# Patient Record
Sex: MALE | Race: WHITE | Employment: FULL TIME | URBAN - METROPOLITAN AREA
[De-identification: names, ages, dates, MRNs, and addresses within clinical notes are randomized per-mention and may not be internally consistent; named-entity substitution may affect disease eponyms.]

---

## 2017-01-05 ENCOUNTER — OFFICE VISIT (OUTPATIENT)
Dept: INTERNAL MEDICINE CLINIC | Age: 67
End: 2017-01-05

## 2017-01-05 VITALS
HEART RATE: 85 BPM | DIASTOLIC BLOOD PRESSURE: 82 MMHG | SYSTOLIC BLOOD PRESSURE: 138 MMHG | HEIGHT: 66 IN | TEMPERATURE: 99.1 F | OXYGEN SATURATION: 97 % | RESPIRATION RATE: 16 BRPM | BODY MASS INDEX: 31.66 KG/M2 | WEIGHT: 197 LBS

## 2017-01-05 DIAGNOSIS — I10 ESSENTIAL HYPERTENSION: ICD-10-CM

## 2017-01-05 DIAGNOSIS — Z23 ENCOUNTER FOR IMMUNIZATION: ICD-10-CM

## 2017-01-05 DIAGNOSIS — C61 PROSTATE CARCINOMA (HCC): ICD-10-CM

## 2017-01-05 DIAGNOSIS — E78.2 MIXED HYPERLIPIDEMIA: Primary | ICD-10-CM

## 2017-01-05 RX ORDER — MINERAL OIL
ENEMA (ML) RECTAL
COMMUNITY
End: 2020-10-12

## 2017-01-05 NOTE — PROGRESS NOTES
1. Have you been to the ER, urgent care clinic since your last visit? Hospitalized since your last visit? No    2. Have you seen or consulted any other health care providers outside of the [address] [place] [address] since your last visit? Include any pap smears or colon screening.  No

## 2017-01-05 NOTE — PATIENT INSTRUCTIONS

## 2017-01-05 NOTE — PROGRESS NOTES
HPI:   Routine f/u of HTN/hyperlipidemia  Chol 168 in June 2016  Prostate bx 12/2016 - +CA  Chronic cough d/t post nasal drainage reduced with allegra/flonase; chest CT 7/2016 neg  w/o chest pain/abd. discomfort; no dyspnea or pedal edema; denies constitutional complaints of fever, night sweats or wt loss; no evidence of GI/ hemorrhage; no polyuria/polydipsia. Activity is age appropriate. ROS is otherwise negative. Past Medical History   Diagnosis Date    Anxiety     BPH (benign prostatic hypertrophy)     Cardiac echocardiogram 08/04/2015     EF 58%. No WMA. Gr 2 DDfx. Mild LAE. Mild CARLENE. Mild MR. Mild TR.  Cardiac treadmill stress test 09/26/2012     [facility]:  No ischemic EKG changes. Total exercise time 8:22.  Coronary calcium score 09/26/2012     [facility]:  Calcium score 1337    Diverticulosis 3-2005    Essential tremor     Generalized osteoarthrosis, involving multiple sites     Hypertension     Impotence of organic origin     Microscopic hematuria     Mixed hyperlipidemia     Prostate nodule     Unspecified venous (peripheral) insufficiency        Past Surgical History   Procedure Laterality Date    Hx acl reconstruction       right    Hx cervical fusion  8/2013     3 procedures through 10/2014    Hx knee arthroscopy Left     Hx cyst removal      Hx colonoscopy  3-2005 virtual 2011       Social History     Social History    Marital status:      Spouse name: N/A    Number of children: N/A    Years of education: N/A     Occupational History    Not on file.      Social History Main Topics    Smoking status: Never Smoker    Smokeless tobacco: Never Used    Alcohol use Yes      Comment: occasional glass of wine    Drug use: No    Sexual activity: Not on file     Other Topics Concern    Not on file     Social History Narrative       Allergies   Allergen Reactions    Other Food Unable to [employer]     Accupril [Quinapril] Cough    Epoxy Resin Other (comments)    Losartan Cough    Other Medication Rash and Itching     Epoxy resin    Pravastatin Myalgia    Tape [Adhesive] Unknown (comments)       Family History   Problem Relation Age of Onset    Diabetes Mother     Kidney Disease Father     Cancer Father      Father  of renal cancer    Diabetes Brother        Current Outpatient Prescriptions   Medication Sig Dispense Refill    fexofenadine (ALLEGRA) 180 mg tablet Take  by mouth daily as needed for Allergies.  desonide (TRIDESILON) 0.05 % cream APPLY TO AFFECTED AREAS TWICE A DAY FOR 3 DAYS CYCLES THEN STOP FOR 5 DAYS  5    sildenafil (REVATIO) 20 mg tablet TAKE 1 TABLET BY MOUTH AS DIRECTED  10    cyclobenzaprine (FLEXERIL) 10 mg tablet 1 tid prn pain/stiffness 30 Tab 1    valsartan-hydrochlorothiazide (DIOVAN HCT) 160-12.5 mg per tablet Take 1 Tab by mouth daily. 90 Tab 3    propranolol LA (INDERAL LA) 80 mg SR capsule Take 1 Cap by mouth daily. 90 Cap 3    rosuvastatin (CRESTOR) 10 mg tablet Take 1 Tab by mouth daily. 90 Tab 3    ascorbic acid (VITAMIN C) 1,000 mg tablet 1,000 mg.  therapeutic multivitamin-minerals (THERAGRAN-M) tablet Take 1 Tab by Mouth Once a Day.  tadalafil (CIALIS, ADCIRCA) 20 mg tablet Take 1 Tab by mouth daily as needed. 6 Tab 6    LORazepam (ATIVAN) 0.5 mg tablet Take  by mouth two (2) times daily as needed.  fluticasone (FLONASE) 50 mcg/actuation nasal spray 2 Sprays by Both Nostrils route daily as needed.  aspirin 81 mg tablet Take 81 mg by mouth daily. Visit Vitals    /82    Pulse 85    Temp 99.1 °F (37.3 °C) (Tympanic)    Resp 16    Ht 5' 6\" (1.676 m)    Wt 197 lb (89.4 kg)    SpO2 97%    BMI 31.8 kg/m2       PE  Well nourished in NAD  HEENT: OP: clear. Neck: supple w/o mass or bruits. Chest: clear. CV: RRR w/o m,r,g; pulses intact. Abd: soft, NT, w/o HSM or mass. Ext: w/o edema.   Neuro: essential tremor otherwise NF.    Assessment and Plan    Encounter Diagnoses   Name Primary?  Mixed hyperlipidemia Yes    Essential hypertension     Prostate carcinoma ([facility] [address]     Encounter for immunization    hyperlipidemia - continue dietary/exercise efforts  HTN - controlled  Recent dx of prostate CA managed by urology  Discussed the patient's above normal BMI with him. I have recommended the following interventions: dietary management education, guidance, and counseling .   The BMI follow up plan is as follows: BMI is out of normal parameters and plan is as follows: I have counseled this patient on diet and exercise regimens   Flu vaccine given  No change in rx  OV 6 mos or prn  I have explained plan to patient and the patient verbalizes understanding

## 2017-01-10 RX ORDER — CYCLOBENZAPRINE HCL 10 MG
TABLET ORAL
Qty: 30 TAB | Refills: 1 | Status: SHIPPED | OUTPATIENT
Start: 2017-01-10 | End: 2018-07-10 | Stop reason: SDUPTHER

## 2017-01-17 PROBLEM — N52.9 ED (ERECTILE DYSFUNCTION) OF ORGANIC ORIGIN: Status: ACTIVE | Noted: 2017-01-17

## 2017-01-17 PROBLEM — C61 PROSTATE CANCER (HCC): Status: ACTIVE | Noted: 2017-01-17

## 2017-03-08 RX ORDER — LORAZEPAM 0.5 MG/1
0.5 TABLET ORAL
Qty: 60 TAB | Refills: 1 | Status: SHIPPED | OUTPATIENT
Start: 2017-03-08 | End: 2017-10-19 | Stop reason: SDUPTHER

## 2017-03-08 NOTE — TELEPHONE ENCOUNTER
Requested Prescriptions     Pending Prescriptions Disp Refills    LORazepam (ATIVAN) 0.5 mg tablet       Sig: Take  by mouth two (2) times daily as needed.      Send to [facility] on [facility]

## 2017-03-29 RX ORDER — PROPRANOLOL HYDROCHLORIDE 80 MG/1
80 CAPSULE, EXTENDED RELEASE ORAL DAILY
Qty: 90 CAP | Refills: 3 | Status: SHIPPED | OUTPATIENT
Start: 2017-03-29 | End: 2018-11-21 | Stop reason: SDUPTHER

## 2017-08-17 ENCOUNTER — LAB ONLY (OUTPATIENT)
Dept: INTERNAL MEDICINE CLINIC | Age: 67
End: 2017-08-17

## 2017-08-17 ENCOUNTER — HOSPITAL ENCOUNTER (OUTPATIENT)
Dept: LAB | Age: 67
Discharge: HOME | End: 2017-08-17
Payer: COMMERCIAL

## 2017-08-17 DIAGNOSIS — I10 ESSENTIAL HYPERTENSION: ICD-10-CM

## 2017-08-17 DIAGNOSIS — I10 ESSENTIAL HYPERTENSION: Primary | ICD-10-CM

## 2017-08-17 LAB
ALBUMIN SERPL-MCNC: 4 G/DL (ref 3.4–5)
ALBUMIN/GLOB SERPL: 1.3 {RATIO} (ref 0.8–1.7)
ALP SERPL-CCNC: 81 U/L (ref 45–117)
ALT SERPL-CCNC: 43 U/L (ref 16–61)
ANION GAP SERPL CALC-SCNC: 6 MMOL/L (ref 3–18)
AST SERPL-CCNC: 26 U/L (ref 15–37)
BASOPHILS # BLD: 0.1 K/UL (ref 0–0.06)
BASOPHILS NFR BLD: 1 % (ref 0–2)
BILIRUB SERPL-MCNC: 0.3 MG/DL (ref 0.2–1)
BUN SERPL-MCNC: 28 MG/DL (ref 7–18)
BUN/CREAT SERPL: 22 (ref 12–20)
CALCIUM SERPL-MCNC: 8.8 MG/DL (ref 8.5–10.1)
CHLORIDE SERPL-SCNC: 103 MMOL/L (ref 100–108)
CHOLEST SERPL-MCNC: 164 MG/DL
CO2 SERPL-SCNC: 29 MMOL/L (ref 21–32)
CREAT SERPL-MCNC: 1.26 MG/DL (ref 0.6–1.3)
DIFFERENTIAL METHOD BLD: ABNORMAL
EOSINOPHIL # BLD: 0.5 K/UL (ref 0–0.4)
EOSINOPHIL NFR BLD: 9 % (ref 0–5)
ERYTHROCYTE [DISTWIDTH] IN BLOOD BY AUTOMATED COUNT: 12.1 % (ref 11.6–14.5)
GLOBULIN SER CALC-MCNC: 3.2 G/DL (ref 2–4)
GLUCOSE SERPL-MCNC: 107 MG/DL (ref 74–99)
HCT VFR BLD AUTO: 43 % (ref 36–48)
HDLC SERPL-MCNC: 47 MG/DL (ref 40–60)
HDLC SERPL: 3.5 {RATIO} (ref 0–5)
HGB BLD-MCNC: 14 G/DL (ref 13–16)
LDLC SERPL CALC-MCNC: 67.4 MG/DL (ref 0–100)
LIPID PROFILE,FLP: ABNORMAL
LYMPHOCYTES # BLD: 1.4 K/UL (ref 0.9–3.6)
LYMPHOCYTES NFR BLD: 25 % (ref 21–52)
MCH RBC QN AUTO: 33.3 PG (ref 24–34)
MCHC RBC AUTO-ENTMCNC: 32.6 G/DL (ref 31–37)
MCV RBC AUTO: 102.4 FL (ref 74–97)
MONOCYTES # BLD: 0.6 K/UL (ref 0.05–1.2)
MONOCYTES NFR BLD: 10 % (ref 3–10)
NEUTS SEG # BLD: 3.1 K/UL (ref 1.8–8)
NEUTS SEG NFR BLD: 55 % (ref 40–73)
PLATELET # BLD AUTO: 205 K/UL (ref 135–420)
PMV BLD AUTO: 10.6 FL (ref 9.2–11.8)
POTASSIUM SERPL-SCNC: 4.5 MMOL/L (ref 3.5–5.5)
PROT SERPL-MCNC: 7.2 G/DL (ref 6.4–8.2)
RBC # BLD AUTO: 4.2 M/UL (ref 4.7–5.5)
SODIUM SERPL-SCNC: 138 MMOL/L (ref 136–145)
TRIGL SERPL-MCNC: 248 MG/DL (ref ?–150)
VLDLC SERPL CALC-MCNC: 49.6 MG/DL
WBC # BLD AUTO: 5.7 K/UL (ref 4.6–13.2)

## 2017-08-17 PROCEDURE — 80053 COMPREHEN METABOLIC PANEL: CPT | Performed by: INTERNAL MEDICINE

## 2017-08-17 PROCEDURE — 80061 LIPID PANEL: CPT | Performed by: INTERNAL MEDICINE

## 2017-08-17 PROCEDURE — 85025 COMPLETE CBC W/AUTO DIFF WBC: CPT | Performed by: INTERNAL MEDICINE

## 2017-08-18 ENCOUNTER — OFFICE VISIT (OUTPATIENT)
Dept: INTERNAL MEDICINE CLINIC | Age: 67
End: 2017-08-18

## 2017-08-18 VITALS
RESPIRATION RATE: 16 BRPM | HEART RATE: 82 BPM | BODY MASS INDEX: 30.7 KG/M2 | DIASTOLIC BLOOD PRESSURE: 80 MMHG | SYSTOLIC BLOOD PRESSURE: 134 MMHG | HEIGHT: 66 IN | OXYGEN SATURATION: 97 % | WEIGHT: 191 LBS | TEMPERATURE: 98.6 F

## 2017-08-18 DIAGNOSIS — Z00.00 ROUTINE GENERAL MEDICAL EXAMINATION AT A HEALTH CARE FACILITY: Primary | ICD-10-CM

## 2017-08-18 DIAGNOSIS — Z23 ENCOUNTER FOR IMMUNIZATION: ICD-10-CM

## 2017-08-18 DIAGNOSIS — E78.2 MIXED HYPERLIPIDEMIA: ICD-10-CM

## 2017-08-18 DIAGNOSIS — I10 ESSENTIAL HYPERTENSION: ICD-10-CM

## 2017-08-18 NOTE — PROGRESS NOTES
1. Have you been to the ER, urgent care clinic since your last visit? Hospitalized since your last visit? No    2. Have you seen or consulted any other health care providers outside of the [facility] since your last visit? Include any pap smears or colon screening.  Yes When: july 31 Where: Dr Abundio Suazo Reason for visit: urologist

## 2017-08-18 NOTE — PATIENT INSTRUCTIONS

## 2017-08-18 NOTE — PROGRESS NOTES
HPI:   Routine check up  Hx notable for HTN/hyperlipidemia (recent chol 164)  Pt has chosen active surveillance for f/u of recent dx of prostate CA  w/o chest pain/abd. discomfort; no dyspnea, cough or pedal edema; denies constitutional complaints of fever, night sweats or wt loss; no evidence of GI/ hemorrhage; no polyuria/polydipsia. Activity is age appropriate. ROS is otherwise negative. Past Medical History:   Diagnosis Date    Anxiety     BPH (benign prostatic hypertrophy)     Cardiac echocardiogram 08/04/2015    EF 58%. No WMA. Gr 2 DDfx. Mild LAE. Mild CARLENE. Mild MR. Mild TR.  Cardiac treadmill stress test 09/26/2012    [facility]:  No ischemic EKG changes. Total exercise time 8:22.  Coronary calcium score 09/26/2012    [facility]:  Calcium score 1337    Diverticulosis 3-2005    Essential tremor     Generalized osteoarthrosis, involving multiple sites     Hypertension     Impotence of organic origin     Microscopic hematuria     Mixed hyperlipidemia     Personal history of prostate cancer     Prostate nodule     Unspecified venous (peripheral) insufficiency        Past Surgical History:   Procedure Laterality Date    HX ACL RECONSTRUCTION      right    HX CERVICAL FUSION  8/2013    3 procedures through 10/2014    HX COLONOSCOPY  3-2005    virtual 2011    HX CYST REMOVAL      HX KNEE ARTHROSCOPY Left     HX UROLOGICAL  12/23/2016    Prostate Biopsy With Dr. Andreea Wolf History     Social History    Marital status:      Spouse name: N/A    Number of children: N/A    Years of education: N/A     Occupational History    Not on file.      Social History Main Topics    Smoking status: Never Smoker    Smokeless tobacco: Never Used    Alcohol use Yes      Comment: occasional glass of wine    Drug use: No    Sexual activity: Not on file     Other Topics Concern    Not on file     Social History Narrative       Allergies Allergen Reactions    Other Food Unable to Obtain     Tuna Fish     Accupril [Quinapril] Cough    Epoxy Resin Other (comments)    Losartan Cough    Other Medication Rash and Itching     Epoxy resin    Pravastatin Myalgia    Tape [Adhesive] Unknown (comments)       Family History   Problem Relation Age of Onset    Diabetes Mother     Kidney Disease Father     Cancer Father      Father  of renal cancer    Diabetes Brother        Current Outpatient Prescriptions   Medication Sig Dispense Refill    propranolol LA (INDERAL LA) 80 mg SR capsule Take 1 Cap by mouth daily. 90 Cap 3    LORazepam (ATIVAN) 0.5 mg tablet Take 1 Tab by mouth two (2) times daily as needed. Max Daily Amount: 1 mg. 60 Tab 1    cyclobenzaprine (FLEXERIL) 10 mg tablet 1 tid prn pain/stiffness 30 Tab 1    fexofenadine (ALLEGRA) 180 mg tablet Take  by mouth daily as needed for Allergies.  desonide (TRIDESILON) 0.05 % cream APPLY TO AFFECTED AREAS TWICE A DAY FOR 3 DAYS CYCLES THEN STOP FOR 5 DAYS  5    sildenafil (REVATIO) 20 mg tablet TAKE 1 TABLET BY MOUTH AS DIRECTED  10    valsartan-hydrochlorothiazide (DIOVAN HCT) 160-12.5 mg per tablet Take 1 Tab by mouth daily. 90 Tab 3    rosuvastatin (CRESTOR) 10 mg tablet Take 1 Tab by mouth daily. 90 Tab 3    ascorbic acid (VITAMIN C) 1,000 mg tablet 1,000 mg.  therapeutic multivitamin-minerals (THERAGRAN-M) tablet Take 1 Tab by Mouth Once a Day.  fluticasone (FLONASE) 50 mcg/actuation nasal spray 2 Sprays by Both Nostrils route daily as needed.  aspirin 81 mg tablet Take 81 mg by mouth daily. Visit Vitals    /80 (BP 1 Location: Left arm, BP Patient Position: Sitting)    Pulse 82    Temp 98.6 °F (37 °C) (Tympanic)    Resp 16    Ht 5' 6\" (1.676 m)    Wt 191 lb (86.6 kg)    SpO2 97%    BMI 30.83 kg/m2       PE  Well nourished in NAD  HEENT: OP: clear. Neck: +kyphosis with LROM d/t past surgery; w/o mass or bruits. Chest: clear.   CV: RRR w/o m,r,g; pulses intact. Abd: soft, NT, w/o HSM or mass. Rectal: per urology  Ext: w/o edema. Neuro: essential tremor otherwise NF. Assessment and Plan    Encounter Diagnoses   Name Primary?  Routine general medical examination at a health care facility Yes    Essential hypertension     Mixed hyperlipidemia    HTN - controlled  Hyperlipidemia - continue dietary/exercise efforts  Prevnar/flu vaccines given  I have reviewed/discussed the above normal BMI with the patient. I have recommended the following interventions: dietary management education, guidance, and counseling . Paty Espinoza     No change in rx  OV 6 - 12 mos or prn  I have explained plan to patient and the patient verbalizes understanding

## 2017-10-19 ENCOUNTER — TELEPHONE (OUTPATIENT)
Dept: INTERNAL MEDICINE CLINIC | Age: 67
End: 2017-10-19

## 2017-10-19 RX ORDER — LORAZEPAM 0.5 MG/1
0.5 TABLET ORAL
Qty: 60 TAB | Refills: 1 | Status: SHIPPED | OUTPATIENT
Start: 2017-10-19 | End: 2018-08-21 | Stop reason: SDUPTHER

## 2017-10-19 NOTE — TELEPHONE ENCOUNTER
Requested Prescriptions     Pending Prescriptions Disp Refills    LORazepam (ATIVAN) 0.5 mg tablet 60 Tab 1     Sig: Take 1 Tab by mouth two (2) times daily as needed. Max Daily Amount: 1 mg.

## 2017-12-14 ENCOUNTER — OFFICE VISIT (OUTPATIENT)
Dept: CARDIOLOGY CLINIC | Age: 67
End: 2017-12-14

## 2017-12-14 VITALS
DIASTOLIC BLOOD PRESSURE: 90 MMHG | WEIGHT: 192 LBS | HEART RATE: 74 BPM | HEIGHT: 66 IN | OXYGEN SATURATION: 97 % | BODY MASS INDEX: 30.86 KG/M2 | SYSTOLIC BLOOD PRESSURE: 130 MMHG

## 2017-12-14 DIAGNOSIS — I10 ESSENTIAL HYPERTENSION: ICD-10-CM

## 2017-12-14 DIAGNOSIS — R93.1 AGATSTON CORONARY ARTERY CALCIUM SCORE GREATER THAN 400: ICD-10-CM

## 2017-12-14 DIAGNOSIS — E78.5 DYSLIPIDEMIA, GOAL LDL BELOW 70: ICD-10-CM

## 2017-12-14 DIAGNOSIS — I25.10 CORONARY ARTERY DISEASE INVOLVING NATIVE CORONARY ARTERY OF NATIVE HEART WITHOUT ANGINA PECTORIS: Primary | ICD-10-CM

## 2017-12-14 NOTE — PROGRESS NOTES
HISTORY OF PRESENT ILLNESS  Sheri Edwards is a 75 y.o. male. Hypertension   Pertinent negatives include no chest pain, no abdominal pain, no headaches and no shortness of breath. Patient presents for a follow-up office visit. The patient has a past medical history significant for long-standing hypertension and dyslipidemia. He underwent a coronary calcium score in 2012, and was found to have a significantly elevated score of 1337, which is in the upper quartile. He also underwent an exercise treadmill stress test, which was a normal test at 8 minutes 22 seconds a total exercise time, which was negative for ischemia. The patient was last seen in the office approximately one year ago. Since last visit, he reports she has been doing fairly well. He was diagnosed with prostate cancer since last visit, but was recommended to pursue conservative management. He denies any major change in his activity level. No exertional chest pain or shortness of breath. He tries to walk several days a week for at least 30 minutes each time. He also lifts weights occasionally. No palpitations, dizziness or syncope. Past Medical History:   Diagnosis Date    Anxiety     BPH (benign prostatic hypertrophy)     Cardiac echocardiogram 08/04/2015    EF 58%. No WMA. Gr 2 DDfx. Mild LAE. Mild CARLENE. Mild MR. Mild TR.  Cardiac treadmill stress test 09/26/2012    [facility]:  No ischemic EKG changes. Total exercise time 8:22.       Coronary calcium score 09/26/2012    [facility]:  Calcium score 1337    Diverticulosis 3-2005    Essential tremor     Generalized osteoarthrosis, involving multiple sites     Hypertension     Impotence of organic origin     Microscopic hematuria     Mixed hyperlipidemia     Personal history of prostate cancer     Prostate nodule     Unspecified venous (peripheral) insufficiency       Current Outpatient Prescriptions   Medication Sig Dispense Refill  LORazepam (ATIVAN) 0.5 mg tablet Take 1 Tab by mouth two (2) times daily as needed. Max Daily Amount: 1 mg. 60 Tab 1    propranolol LA (INDERAL LA) 80 mg SR capsule Take 1 Cap by mouth daily. 90 Cap 3    cyclobenzaprine (FLEXERIL) 10 mg tablet 1 tid prn pain/stiffness 30 Tab 1    fexofenadine (ALLEGRA) 180 mg tablet Take  by mouth daily as needed for Allergies.  desonide (TRIDESILON) 0.05 % cream APPLY TO AFFECTED AREAS TWICE A DAY FOR 3 DAYS CYCLES THEN STOP FOR 5 DAYS  5    sildenafil (REVATIO) 20 mg tablet TAKE 1 TABLET BY MOUTH AS DIRECTED  10    valsartan-hydrochlorothiazide (DIOVAN HCT) 160-12.5 mg per tablet Take 1 Tab by mouth daily. 90 Tab 3    rosuvastatin (CRESTOR) 10 mg tablet Take 1 Tab by mouth daily. 90 Tab 3    ascorbic acid (VITAMIN C) 1,000 mg tablet 1,000 mg.  therapeutic multivitamin-minerals (THERAGRAN-M) tablet Take 1 Tab by Mouth Once a Day.  fluticasone (FLONASE) 50 mcg/actuation nasal spray 2 Sprays by Both Nostrils route daily as needed.  aspirin 81 mg tablet Take 81 mg by mouth daily. Allergies   Allergen Reactions    Other Food Unable to Obtain     Tuna Fish     Accupril [Quinapril] Cough    Epoxy Resin Other (comments)    Losartan Cough    Other Medication Rash and Itching     Epoxy resin    Pravastatin Myalgia    Tape [Adhesive] Unknown (comments)      Social History   Substance Use Topics    Smoking status: Never Smoker    Smokeless tobacco: Never Used    Alcohol use Yes      Comment: occasional glass of wine            Review of Systems   Constitutional: Negative for chills, fever and weight loss. HENT: Negative for nosebleeds. Eyes: Negative for blurred vision and double vision. Respiratory: Negative for cough, shortness of breath and wheezing. Cardiovascular: Negative for chest pain, palpitations, orthopnea, claudication, leg swelling and PND. Gastrointestinal: Negative for abdominal pain, heartburn, nausea and vomiting. Genitourinary: Negative for dysuria and hematuria. Musculoskeletal: Negative for falls, myalgias and neck pain. Skin: Negative for rash. Neurological: Negative for dizziness, focal weakness and headaches. Endo/Heme/Allergies: Does not bruise/bleed easily. Psychiatric/Behavioral: Negative for substance abuse. Visit Vitals    /90    Pulse 74    Ht 5' 6\" (1.676 m)    Wt 87.1 kg (192 lb)    SpO2 97%    BMI 30.99 kg/m2      Physical Exam   Constitutional: He is oriented to person, place, and time. He appears well-developed and well-nourished. HENT:   Head: Normocephalic and atraumatic. Eyes: Conjunctivae are normal.   Neck: Neck supple. No JVD present. Carotid bruit is not present. Cardiovascular: Normal rate, regular rhythm, S1 normal, S2 normal and normal pulses. Exam reveals no gallop and no S3. No murmur heard. Pulmonary/Chest: Breath sounds normal. He has no wheezes. He has no rales. Abdominal: Soft. Bowel sounds are normal. There is no tenderness. Musculoskeletal: He exhibits no edema. Neurological: He is alert and oriented to person, place, and time. Skin: Skin is warm and dry. EKG:  Normal sinus rhythm, normal axis, normal  QTc interval, no ST or T wave abnormalities concerning for ischemia. No change compared to the previous EKG. ASSESSMENT and PLAN    Coronary artery disease. Nonobstructive by prior stress test and symptomatology. Patient had an elevated coronary calcium score > 400 in 2012. At that time he underwent a negative treadmill stress test.  Patient remains asymptomatic, but is at increased risk for a cardiovascular event given his high calcium score. I would continue his current cardiac regimen. Dyslipidemia. The patient is now taking Crestor 10 mg daily. He underwent a lipid panel earlier this year in August 2017 which was at goal.  LDL 67. Hypertension.  The patient's blood pressure has been reasonably well controlled on his current regimen, which I would continue. Followup in 12 months, sooner if needed.

## 2017-12-14 NOTE — PROGRESS NOTES
1. Have you been to the ER, urgent care clinic since your last visit? Hospitalized since your last visit?no  2. Have you seen or consulted any other health care providers outside of the [address] [place] [address] since your last visit? Include any pap smears or colon screening.   no

## 2017-12-14 NOTE — MR AVS SNAPSHOT
Visit Information Date & Time Provider Department Dept. Phone Encounter #  
 12/14/2017  2:40 PM Linda Carrel, MD [facility]  Your Appointments 1/18/2018 10:45 AM  
ESTABLISHED PATIENT with Jena Hyatt MD  
[facility]) Appt Note: Return in about 4 months (around 1/1/2018) for PSA prior [address] [facility], [place] [address] [facility] [address]  
  
   
 [address] [facility] [id number]  
  
    
 12/14/2018  8:40 AM  
Follow Up with Linda Carrel, MD  
[facility] ([address]) Appt Note: 1 year follow up [facility] [address] [address] [address] [phone] [address] [place] [address] [address] Upcoming Health Maintenance Date Due Hepatitis C Screening 1950 DTaP/Tdap/Td series (1 - Tdap) 5/10/1970 GLAUCOMA SCREENING Q2Y 5/10/2014 MEDICARE YEARLY EXAM 5/10/2014 Pneumococcal 65+ High/Highest Risk (2 of 2 - PPSV23) 12/14/2018 COLONOSCOPY 8/2/2019 Allergies as of 12/14/2017  Review Complete On: 12/14/2017 By: Reggie Love Severity Noted Reaction Type Reactions Other Food  08/28/2014    Unable to Obtain [facility] Accupril [Quinapril]    Cough Epoxy Resin  09/17/2015    Other (comments) Losartan    Cough Other Medication  09/04/2012    Rash, Itching Epoxy resin Pravastatin    Myalgia Tape [Adhesive]  09/04/2012    Unknown (comments) Current Immunizations  Reviewed on 6/20/2016 Name Date Influenza High Dose Vaccine PF 8/18/2017, 1/5/2017 Influenza Vaccine 8/18/2015, 9/19/2014 12:00 AM  
 Pneumococcal Conjugate (PCV-13) 8/18/2017 Pneumococcal Polysaccharide (PPSV-23) 12/14/2013 Zoster Vaccine, Live 6/1/2014 Not reviewed this visit You Were Diagnosed With   
  
 Codes Comments Coronary artery disease involving native coronary artery of native heart without angina pectoris    -  Primary ICD-10-CM: I25.10 ICD-9-CM: 414.01 Vitals BP Pulse Height(growth percentile) Weight(growth percentile) SpO2 BMI  
 130/90 74 5' 6\" (1.676 m) 192 lb (87.1 kg) 97% 30.99 kg/m2 Smoking Status Never Smoker Vitals History BMI and BSA Data Body Mass Index Body Surface Area 30.99 kg/m 2 2.01 m 2 Preferred Pharmacy Pharmacy Name Phone [id number], [address] [facility] [phone] Your Updated Medication List  
  
   
This list is accurate as of: 12/14/17  3:17 PM.  Always use your most recent med list.  
  
  
  
  
 aspirin 81 mg tablet Take 81 mg by mouth daily. cyclobenzaprine 10 mg tablet Commonly known as:  FLEXERIL  
1 tid prn pain/stiffness  
  
 desonide 0.05 % cream  
Commonly known as:  TRIDESILON  
APPLY TO AFFECTED AREAS TWICE A DAY FOR 3 DAYS CYCLES THEN STOP FOR 5 DAYS  
  
 fexofenadine 180 mg tablet Commonly known as:  Morrie Mantis Take  by mouth daily as needed for Allergies. fluticasone 50 mcg/actuation nasal spray Commonly known as:  [place] 2 Sprays by Both Nostrils route daily as needed. LORazepam 0.5 mg tablet Commonly known as:  ATIVAN Take 1 Tab by mouth two (2) times daily as needed. Max Daily Amount: 1 mg. propranolol LA 80 mg SR capsule Commonly known as:  INDERAL LA Take 1 Cap by mouth daily. rosuvastatin 10 mg tablet Commonly known as:  CRESTOR Take 1 Tab by mouth daily. sildenafil (antihypertensive) 20 mg tablet Commonly known as:  REVATIO  
TAKE 1 TABLET BY MOUTH AS DIRECTED  
  
 therapeutic multivitamin-minerals tablet Commonly known as:  THERAGRAN-M Take 1 Tab by Mouth Once a Day. valsartan-hydroCHLOROthiazide 160-12.5 mg per tablet Commonly known as:  DIOVAN HCT Take 1 Tab by mouth daily. VITAMIN C 1,000 mg tablet Generic drug:  ascorbic acid (vitamin C)  
1,000 mg. We Performed the Following AMB POC EKG ROUTINE W/ 12 LEADS, INTER & REP [69945 CPT(R)] Introducing [facility] SERVICES! Dear Lisseth Tillman: Thank you for requesting a [employer] account. Our records indicate that you already have an active [employer] account. You can access your account anytime at [email or web address] Did you know that you can access your hospital and ER discharge instructions at any time in [employer]? You can also review all of your test results from your hospital stay or ER visit. Additional Information If you have questions, please visit the Frequently Asked Questions section of the [employer] website at [email or web address] Remember, [employer] is NOT to be used for urgent needs. For medical emergencies, dial 911. Now available from your iPhone and Android! Please provide this summary of care documentation to your next provider. Your primary care clinician is listed as Yesika Benavides. If you have any questions after today's visit, please call [phone].

## 2018-01-18 PROBLEM — F40.240 CLAUSTROPHOBIA: Status: ACTIVE | Noted: 2018-01-18

## 2018-07-10 RX ORDER — CYCLOBENZAPRINE HCL 10 MG
TABLET ORAL
Qty: 30 TAB | Refills: 1 | Status: SHIPPED | OUTPATIENT
Start: 2018-07-10 | End: 2019-07-28 | Stop reason: SDUPTHER

## 2018-08-02 DIAGNOSIS — I10 ESSENTIAL HYPERTENSION, BENIGN: Primary | ICD-10-CM

## 2018-08-20 ENCOUNTER — HOSPITAL ENCOUNTER (OUTPATIENT)
Dept: LAB | Age: 68
Discharge: HOME | End: 2018-08-20
Payer: COMMERCIAL

## 2018-08-20 DIAGNOSIS — I10 ESSENTIAL HYPERTENSION, BENIGN: ICD-10-CM

## 2018-08-20 LAB
ALBUMIN SERPL-MCNC: 4 G/DL (ref 3.4–5)
ALBUMIN/GLOB SERPL: 1.2 {RATIO} (ref 0.8–1.7)
ALP SERPL-CCNC: 83 U/L (ref 45–117)
ALT SERPL-CCNC: 27 U/L (ref 16–61)
ANION GAP SERPL CALC-SCNC: 10 MMOL/L (ref 3–18)
AST SERPL-CCNC: 23 U/L (ref 15–37)
BASOPHILS # BLD: 0.1 K/UL (ref 0–0.1)
BASOPHILS NFR BLD: 1 % (ref 0–2)
BILIRUB SERPL-MCNC: 0.4 MG/DL (ref 0.2–1)
BUN SERPL-MCNC: 28 MG/DL (ref 7–18)
BUN/CREAT SERPL: 22 (ref 12–20)
CALCIUM SERPL-MCNC: 8.9 MG/DL (ref 8.5–10.1)
CHLORIDE SERPL-SCNC: 103 MMOL/L (ref 100–108)
CHOLEST SERPL-MCNC: 149 MG/DL
CO2 SERPL-SCNC: 26 MMOL/L (ref 21–32)
CREAT SERPL-MCNC: 1.29 MG/DL (ref 0.6–1.3)
DIFFERENTIAL METHOD BLD: ABNORMAL
EOSINOPHIL # BLD: 0.8 K/UL (ref 0–0.4)
EOSINOPHIL NFR BLD: 10 % (ref 0–5)
ERYTHROCYTE [DISTWIDTH] IN BLOOD BY AUTOMATED COUNT: 11.6 % (ref 11.6–14.5)
GLOBULIN SER CALC-MCNC: 3.3 G/DL (ref 2–4)
GLUCOSE SERPL-MCNC: 108 MG/DL (ref 74–99)
HCT VFR BLD AUTO: 43.8 % (ref 36–48)
HDLC SERPL-MCNC: 44 MG/DL (ref 40–60)
HDLC SERPL: 3.4 {RATIO} (ref 0–5)
HGB BLD-MCNC: 14.6 G/DL (ref 13–16)
LDLC SERPL CALC-MCNC: 69.4 MG/DL (ref 0–100)
LIPID PROFILE,FLP: ABNORMAL
LYMPHOCYTES # BLD: 1.7 K/UL (ref 0.9–3.6)
LYMPHOCYTES NFR BLD: 19 % (ref 21–52)
MCH RBC QN AUTO: 33.7 PG (ref 24–34)
MCHC RBC AUTO-ENTMCNC: 33.3 G/DL (ref 31–37)
MCV RBC AUTO: 101.2 FL (ref 74–97)
MONOCYTES # BLD: 0.7 K/UL (ref 0.05–1.2)
MONOCYTES NFR BLD: 8 % (ref 3–10)
NEUTS SEG # BLD: 5.4 K/UL (ref 1.8–8)
NEUTS SEG NFR BLD: 62 % (ref 40–73)
PLATELET # BLD AUTO: 256 K/UL (ref 135–420)
PMV BLD AUTO: 11 FL (ref 9.2–11.8)
POTASSIUM SERPL-SCNC: 4.7 MMOL/L (ref 3.5–5.5)
PROT SERPL-MCNC: 7.3 G/DL (ref 6.4–8.2)
RBC # BLD AUTO: 4.33 M/UL (ref 4.7–5.5)
SODIUM SERPL-SCNC: 139 MMOL/L (ref 136–145)
TRIGL SERPL-MCNC: 178 MG/DL (ref ?–150)
VLDLC SERPL CALC-MCNC: 35.6 MG/DL
WBC # BLD AUTO: 8.7 K/UL (ref 4.6–13.2)

## 2018-08-20 PROCEDURE — 80061 LIPID PANEL: CPT | Performed by: INTERNAL MEDICINE

## 2018-08-20 PROCEDURE — 85025 COMPLETE CBC W/AUTO DIFF WBC: CPT | Performed by: INTERNAL MEDICINE

## 2018-08-20 PROCEDURE — 36415 COLL VENOUS BLD VENIPUNCTURE: CPT | Performed by: INTERNAL MEDICINE

## 2018-08-20 PROCEDURE — 80053 COMPREHEN METABOLIC PANEL: CPT | Performed by: INTERNAL MEDICINE

## 2018-08-21 ENCOUNTER — OFFICE VISIT (OUTPATIENT)
Dept: INTERNAL MEDICINE CLINIC | Age: 68
End: 2018-08-21

## 2018-08-21 VITALS
HEART RATE: 83 BPM | DIASTOLIC BLOOD PRESSURE: 84 MMHG | WEIGHT: 195 LBS | TEMPERATURE: 98.6 F | OXYGEN SATURATION: 98 % | HEIGHT: 67 IN | SYSTOLIC BLOOD PRESSURE: 122 MMHG | BODY MASS INDEX: 30.61 KG/M2 | RESPIRATION RATE: 16 BRPM

## 2018-08-21 DIAGNOSIS — Z00.00 ROUTINE GENERAL MEDICAL EXAMINATION AT A HEALTH CARE FACILITY: Primary | ICD-10-CM

## 2018-08-21 DIAGNOSIS — F41.9 ANXIETY: ICD-10-CM

## 2018-08-21 DIAGNOSIS — I10 ESSENTIAL HYPERTENSION: ICD-10-CM

## 2018-08-21 DIAGNOSIS — E78.2 MIXED HYPERLIPIDEMIA: ICD-10-CM

## 2018-08-21 RX ORDER — LORAZEPAM 0.5 MG/1
0.5 TABLET ORAL
Qty: 60 TAB | Refills: 1 | Status: SHIPPED | OUTPATIENT
Start: 2018-08-21 | End: 2019-08-22 | Stop reason: SDUPTHER

## 2018-08-21 NOTE — PATIENT INSTRUCTIONS

## 2018-08-21 NOTE — PROGRESS NOTES
1. Have you been to the ER, urgent care clinic since your last visit? Hospitalized since your last visit? No    2. Have you seen or consulted any other health care providers outside of the [address] [place] [address] since your last visit? Include any pap smears or colon screening.  No

## 2018-08-21 NOTE — PROGRESS NOTES
HPI:   Check up  F/u visit for routine evaluation of HTN, hyperlipidemia  Recent chol 149  Sees urology for PC (active surveillance)  occas anxiety and uses ativan infrequently  w/o chest pain/abd. discomfort; no dyspnea, cough or pedal edema; denies constitutional complaints of fever, night sweats or wt loss; no evidence of GI/ hemorrhage. Activity is age appropriate. ROS is otherwise negative. Past Medical History:   Diagnosis Date    Anxiety     BPH (benign prostatic hypertrophy)     Cardiac echocardiogram 08/04/2015    EF 58%. No WMA. Gr 2 DDfx. Mild LAE. Mild CARLENE. Mild MR. Mild TR.  Cardiac treadmill stress test 09/26/2012    [facility]:  No ischemic EKG changes. Total exercise time 8:22.  Coronary calcium score 09/26/2012    [facility]:  Calcium score 1337    Diverticulosis 3-2005    Essential tremor     Generalized osteoarthrosis, involving multiple sites     Hypertension     Impotence of organic origin     Microscopic hematuria     Mixed hyperlipidemia     Personal history of prostate cancer     Prostate cancer ([facility] [address]     [id number] Bere 3+3 prostate cancer in 5/12 cores (LA, LM, LLA, LLM, LLB) involving 10-50% of the specimen, presenting PSA: 3.13 ng/mL    Prostate nodule     Unspecified venous (peripheral) insufficiency        Past Surgical History:   Procedure Laterality Date    HX ACL RECONSTRUCTION      right    HX CERVICAL FUSION  8/2013    3 procedures through 10/2014    HX COLONOSCOPY  3-2005 virtual 2011    HX CYST REMOVAL      HX KNEE ARTHROSCOPY Left     HX UROLOGICAL  12/23/2016    Prostate Biopsy With Dr. Kim Oreilly History     Social History    Marital status:      Spouse name: N/A    Number of children: N/A    Years of education: N/A     Occupational History    Not on file.      Social History Main Topics    Smoking status: Never Smoker    Smokeless tobacco: Never Used    Alcohol use Yes Comment: occasional glass of wine    Drug use: No    Sexual activity: Yes     Partners: Female      Comment: somewhat     Other Topics Concern    Not on file     Social History Narrative       Allergies   Allergen Reactions    Other Food Unable to Obtain     Tuna Fish     Accupril [Quinapril] Cough    Epoxy Resin Other (comments)    Losartan Cough    Other Medication Rash and Itching     Epoxy resin    Pravastatin Myalgia    Tape [Adhesive] Unknown (comments)       Family History   Problem Relation Age of Onset    Diabetes Mother     Kidney Disease Father     Cancer Father      Father  of renal cancer    Diabetes Brother        Current Outpatient Prescriptions   Medication Sig Dispense Refill    cyclobenzaprine (FLEXERIL) 10 mg tablet TAKE 1 TABLET BY MOUTH 3 TIMES A DAY AS NEEDED FOR PAIN/STIFFNESS 30 Tab 1    LORazepam (ATIVAN) 0.5 mg tablet Take 1 Tab by mouth two (2) times daily as needed. Max Daily Amount: 1 mg. 60 Tab 1    propranolol LA (INDERAL LA) 80 mg SR capsule Take 1 Cap by mouth daily. 90 Cap 3    fexofenadine (ALLEGRA) 180 mg tablet Take  by mouth daily as needed for Allergies.  desonide (TRIDESILON) 0.05 % cream APPLY TO AFFECTED AREAS TWICE A DAY FOR 3 DAYS CYCLES THEN STOP FOR 5 DAYS  5    sildenafil (REVATIO) 20 mg tablet TAKE 1 TABLET BY MOUTH AS DIRECTED  10    valsartan-hydrochlorothiazide (DIOVAN HCT) 160-12.5 mg per tablet Take 1 Tab by mouth daily. 90 Tab 3    rosuvastatin (CRESTOR) 10 mg tablet Take 1 Tab by mouth daily. 90 Tab 3    ascorbic acid (VITAMIN C) 1,000 mg tablet 1,000 mg.  therapeutic multivitamin-minerals (THERAGRAN-M) tablet Take 1 Tab by Mouth Once a Day.  fluticasone (FLONASE) 50 mcg/actuation nasal spray 2 Sprays by Both Nostrils route daily as needed.  aspirin 81 mg tablet Take 81 mg by mouth daily.                Visit Vitals    /84 (BP 1 Location: Right arm, BP Patient Position: Sitting)    Pulse 83    Temp 98.6 °F (37 °C) (Tympanic)    Resp 16    Ht 5' 7\" (1.702 m)    Wt 195 lb (88.5 kg)    SpO2 98%    BMI 30.54 kg/m2       PE  Well nourished in NAD  HEENT:   OP: clear. Neck: supple w/o mass or bruits. Chest: clear. CV: RRR w/o m,r,g; pulses intact. Abd: soft, NT, w/o HSM or mass. Rectal: per urology  Ext: w/o edema. Neuro: essential tremor    Assessment and Plan    Encounter Diagnoses   Name Primary?     Routine general medical examination at a health care facility Yes    Essential hypertension     Mixed hyperlipidemia    HTN - controlled  hyperlipidemia - continue dietary/exercise efforts  No change in rx  OV 6-12 mos or prn  I have explained plan to patient and the patient verbalizes understanding

## 2018-11-21 RX ORDER — PROPRANOLOL HYDROCHLORIDE 80 MG/1
80 CAPSULE, EXTENDED RELEASE ORAL DAILY
Qty: 90 CAP | Refills: 3 | Status: SHIPPED | OUTPATIENT
Start: 2018-11-21 | End: 2018-11-27 | Stop reason: SDUPTHER

## 2018-11-26 RX ORDER — SILDENAFIL CITRATE 20 MG/1
TABLET ORAL
Qty: 30 TAB | Refills: 9 | Status: SHIPPED | OUTPATIENT
Start: 2018-11-26 | End: 2019-08-23

## 2018-11-27 RX ORDER — PROPRANOLOL HYDROCHLORIDE 80 MG/1
80 CAPSULE, EXTENDED RELEASE ORAL DAILY
Qty: 90 CAP | Refills: 3 | Status: SHIPPED | OUTPATIENT
Start: 2018-11-27 | End: 2019-10-20 | Stop reason: SDUPTHER

## 2019-01-30 ENCOUNTER — OFFICE VISIT (OUTPATIENT)
Dept: CARDIOLOGY CLINIC | Age: 69
End: 2019-01-30

## 2019-01-30 VITALS
BODY MASS INDEX: 30.76 KG/M2 | SYSTOLIC BLOOD PRESSURE: 130 MMHG | OXYGEN SATURATION: 98 % | HEART RATE: 83 BPM | HEIGHT: 67 IN | WEIGHT: 196 LBS | DIASTOLIC BLOOD PRESSURE: 86 MMHG

## 2019-01-30 DIAGNOSIS — I10 ESSENTIAL HYPERTENSION: ICD-10-CM

## 2019-01-30 DIAGNOSIS — E78.5 DYSLIPIDEMIA, GOAL LDL BELOW 70: ICD-10-CM

## 2019-01-30 DIAGNOSIS — R93.1 AGATSTON CORONARY ARTERY CALCIUM SCORE GREATER THAN 400: Primary | ICD-10-CM

## 2019-01-30 DIAGNOSIS — I25.10 CORONARY ARTERY DISEASE INVOLVING NATIVE CORONARY ARTERY OF NATIVE HEART WITHOUT ANGINA PECTORIS: ICD-10-CM

## 2019-01-30 NOTE — PROGRESS NOTES
HISTORY OF PRESENT ILLNESS  Maddy Murdock is a 70 y.o. male. Hypertension   Pertinent negatives include no chest pain, no abdominal pain, no headaches and no shortness of breath. Patient presents for a follow-up office visit. The patient has a past medical history significant for long-standing hypertension and dyslipidemia. He underwent a coronary calcium score in 2012, and was found to have a significantly elevated score of 1337, which is in the upper quartile. He also underwent an exercise treadmill stress test, which was a normal test at 8 minutes 22 seconds a total exercise time, which was negative for ischemia. The patient was last seen in the office approximately one year ago. Since last visit, he reports she has been doing fairly well. He admits he is not quite as active up until recently over the past year because of a right knee injury. However his knee is feeling better, so he has started doing more exercise over the past month. He denies any exertional chest pain or shortness of breath. No leg swelling or claudication. Past Medical History:   Diagnosis Date    Anxiety     BPH (benign prostatic hypertrophy)     Cardiac echocardiogram 08/04/2015    EF 58%. No WMA. Gr 2 DDfx. Mild LAE. Mild CARLENE. Mild MR. Mild TR.  Cardiac treadmill stress test 09/26/2012    [facility]:  No ischemic EKG changes. Total exercise time 8:22.       Coronary calcium score 09/26/2012    [facility]:  Calcium score 1337    Diverticulosis 3-2005    Essential tremor     Generalized osteoarthrosis, involving multiple sites     Hypertension     Impotence of organic origin     Microscopic hematuria     Mixed hyperlipidemia     Personal history of prostate cancer     Prostate cancer ([facility] [address]     [id number] [place] 3+3 prostate cancer in 5/12 cores (LA, LM, LLA, LLM, LLB) involving 10-50% of the specimen, presenting PSA: 3.13 ng/mL    Prostate nodule     Unspecified venous (peripheral) insufficiency       Current Outpatient Medications   Medication Sig Dispense Refill    propranolol LA (INDERAL LA) 80 mg SR capsule Take 1 Cap by mouth daily. 90 Cap 3    sildenafil, antihypertensive, (REVATIO) 20 mg tablet TAKE 1 TABLET BY MOUTH AS DIRECTED 30 Tab 9    rosuvastatin (CRESTOR) 10 mg tablet TAKE 1 TAB BY MOUTH DAILY. 90 Tab 2    valsartan-hydroCHLOROthiazide (DIOVAN-HCT) 160-12.5 mg per tablet TAKE 1 TAB BY MOUTH DAILY. 90 Tab 1    LORazepam (ATIVAN) 0.5 mg tablet Take 1 Tab by mouth two (2) times daily as needed. Max Daily Amount: 1 mg. 60 Tab 1    cyclobenzaprine (FLEXERIL) 10 mg tablet TAKE 1 TABLET BY MOUTH 3 TIMES A DAY AS NEEDED FOR PAIN/STIFFNESS 30 Tab 1    fexofenadine (ALLEGRA) 180 mg tablet Take  by mouth daily as needed for Allergies.  desonide (TRIDESILON) 0.05 % cream APPLY TO AFFECTED AREAS TWICE A DAY FOR 3 DAYS CYCLES THEN STOP FOR 5 DAYS  5    ascorbic acid (VITAMIN C) 1,000 mg tablet 1,000 mg.  therapeutic multivitamin-minerals (THERAGRAN-M) tablet Take 1 Tab by Mouth Once a Day.  fluticasone (FLONASE) 50 mcg/actuation nasal spray 2 Sprays by Both Nostrils route daily as needed.  aspirin 81 mg tablet Take 81 mg by mouth daily. Allergies   Allergen Reactions    Other Food Unable to Obtain     Tuna Fish     Accupril [Quinapril] Cough    Epoxy Resin Other (comments)    Losartan Cough    Other Medication Rash and Itching     Epoxy resin    Pravastatin Myalgia    Tape [Adhesive] Unknown (comments)      Social History     Tobacco Use    Smoking status: Never Smoker    Smokeless tobacco: Never Used   Substance Use Topics    Alcohol use: Yes     Comment: occasional glass of wine    Drug use: No            Review of Systems   Constitutional: Negative for chills, fever and weight loss. HENT: Negative for nosebleeds. Eyes: Negative for blurred vision and double vision.    Respiratory: Negative for cough, shortness of breath and wheezing. Cardiovascular: Negative for chest pain, palpitations, orthopnea, claudication, leg swelling and PND. Gastrointestinal: Negative for abdominal pain, heartburn, nausea and vomiting. Genitourinary: Negative for dysuria and hematuria. Musculoskeletal: Negative for falls, myalgias and neck pain. Skin: Negative for rash. Neurological: Negative for dizziness, focal weakness and headaches. Endo/Heme/Allergies: Does not bruise/bleed easily. Psychiatric/Behavioral: Negative for substance abuse. Visit Vitals  /86   Pulse 83   Ht 5' 7\" (1.702 m)   Wt 88.9 kg (196 lb)   SpO2 98%   BMI 30.70 kg/m²      Physical Exam   Constitutional: He is oriented to person, place, and time. He appears well-developed and well-nourished. HENT:   Head: Normocephalic and atraumatic. Eyes: Conjunctivae are normal.   Neck: Neck supple. No JVD present. Carotid bruit is not present. Cardiovascular: Normal rate, regular rhythm, S1 normal, S2 normal and normal pulses. Exam reveals no gallop and no S3. No murmur heard. Pulmonary/Chest: Breath sounds normal. He has no wheezes. He has no rales. Abdominal: Soft. Bowel sounds are normal. There is no tenderness. Musculoskeletal: He exhibits no edema. Neurological: He is alert and oriented to person, place, and time. Skin: Skin is warm and dry. EKG:  Normal sinus rhythm, normal axis, normal  QTc interval, no ST or T wave abnormalities concerning for ischemia. No change compared to the previous EKG. ASSESSMENT and PLAN    Coronary artery disease. Nonobstructive by prior stress test.  No new symptoms concerning for angina. Patient had an elevated coronary calcium score > 400 in 2012. At that time he underwent a negative treadmill stress test.  I would continue his current cardiac regimen. Dyslipidemia. The patient is now taking Crestor 10 mg daily.   He underwent a repeat lipid panel earlier this year in August 2018 which was at goal.  LDL 71.    Hypertension. The patient's blood pressure has been reasonably well controlled on his current regimen, which I would continue. Followup in 12 months, sooner if needed.

## 2019-01-30 NOTE — PROGRESS NOTES
Kellie Solorio presents today for   Chief Complaint   Patient presents with    Coronary Artery Disease     1 year follow up        Kellie Solorio preferred language for health care discussion is english/other. Is someone accompanying this pt? No     Is the patient using any DME equipment during OV? No     Depression Screening:  PHQ over the last two weeks 8/21/2018   Little interest or pleasure in doing things Not at all   Feeling down, depressed, irritable, or hopeless Not at all   Total Score PHQ 2 0       Learning Assessment:  Learning Assessment 12/8/2014   PRIMARY LEARNER Patient   PRIMARY LANGUAGE ENGLISH   LEARNER PREFERENCE PRIMARY LISTENING       Abuse Screening:  Abuse Screening Questionnaire 1/5/2017   Do you ever feel afraid of your partner? N   Are you in a relationship with someone who physically or mentally threatens you? N   Is it safe for you to go home? Y       Fall Risk  Fall Risk Assessment, last 12 mths 8/21/2018   Able to walk? Yes   Fall in past 12 months? No       Pt currently taking Anticoagulant therapy? ASA 81mg daily     Coordination of Care:  1. Have you been to the ER, urgent care clinic since your last visit? Hospitalized since your last visit? No     2. Have you seen or consulted any other health care providers outside of the [address] [place] [address] since your last visit? Include any pap smears or colon screening.  No

## 2019-04-30 RX ORDER — VALSARTAN AND HYDROCHLOROTHIAZIDE 160; 12.5 MG/1; MG/1
1 TABLET, FILM COATED ORAL DAILY
Qty: 90 TAB | Refills: 1 | Status: SHIPPED | OUTPATIENT
Start: 2019-04-30 | End: 2019-08-22 | Stop reason: SDUPTHER

## 2019-06-04 ENCOUNTER — TELEPHONE (OUTPATIENT)
Dept: FAMILY MEDICINE CLINIC | Age: 69
End: 2019-06-04

## 2019-06-04 NOTE — TELEPHONE ENCOUNTER
Pt called to the office to schedule yearly cpe with Dr. Dionna Youngblood in August but also said that CNA is trying to change his care and they faxed over paperwork to the office that needs to be completed and faxed back by the end of the work week.    Please advise [phone]

## 2019-06-21 DIAGNOSIS — I10 ESSENTIAL HYPERTENSION, BENIGN: Primary | ICD-10-CM

## 2019-06-21 NOTE — PROGRESS NOTES
HPI:   Check up  F/u visit for routine evaluation of HTN,  hyperlipidemia  Chol 149 August 2018  Recent prostate bx showed Gl 6 adenoCA  w/o chest pain/abd. discomfort; no dyspnea, cough or pedal edema; denies constitutional complaints of fever, night sweats or wt loss; no evidence of GI/ hemorrhage. Activity is age appropriate. ROS is otherwise negative. Past Medical History:   Diagnosis Date    Anxiety     BPH (benign prostatic hypertrophy)     Cardiac echocardiogram 08/04/2015    EF 58%. No WMA. Gr 2 DDfx. Mild LAE. Mild CARLENE. Mild MR. Mild TR.  Cardiac treadmill stress test 09/26/2012    [facility]:  No ischemic EKG changes. Total exercise time 8:22.       Coronary calcium score 09/26/2012    [facility]:  Calcium score 1337    Diverticulosis 3-2005    Essential tremor     Generalized osteoarthrosis, involving multiple sites     Hypertension     Impotence of organic origin     Microscopic hematuria     Mixed hyperlipidemia     Personal history of prostate cancer     Prostate cancer ([address]     [id number] Bere 3+3 prostate cancer in 5/12 cores (LA, LM, LLA, LLM, LLB) involving 10-50% of the specimen, presenting PSA: 3.13 ng/mL    Prostate nodule     Unspecified venous (peripheral) insufficiency        Past Surgical History:   Procedure Laterality Date    HX ACL RECONSTRUCTION      right    HX CERVICAL FUSION  8/2013    3 procedures through 10/2014    HX COLONOSCOPY  3-2005 virtual 2011    HX CYST REMOVAL      HX KNEE ARTHROSCOPY Left     HX UROLOGICAL  12/23/2016    Prostate Biopsy With Dr. Placido Mendez History     Socioeconomic History    Marital status:      Spouse name: Not on file    Number of children: Not on file    Years of education: Not on file    Highest education level: Not on file   Occupational History    Not on file   Social Needs    Financial resource strain: Not on file    Food insecurity:     Worry: Not on file     Inability: Not on file    Transportation needs:     Medical: Not on file     Non-medical: Not on file   Tobacco Use    Smoking status: Never Smoker    Smokeless tobacco: Never Used   Substance and Sexual Activity    Alcohol use: Yes     Comment: occasional glass of wine    Drug use: No    Sexual activity: Yes     Partners: Female     Comment: somewhat   Lifestyle    Physical activity:     Days per week: Not on file     Minutes per session: Not on file    Stress: Not on file   Relationships    Social connections:     Talks on phone: Not on file     Gets together: Not on file     Attends Buddhist service: Not on file     Active member of club or organization: Not on file     Attends meetings of clubs or organizations: Not on file     Relationship status: Not on file    Intimate partner violence:     Fear of current or ex partner: Not on file     Emotionally abused: Not on file     Physically abused: Not on file     Forced sexual activity: Not on file   Other Topics Concern    Not on file   Social History Narrative    Not on file       Allergies   Allergen Reactions    Other Food Unable to [employer] Fish     Accupril [Quinapril] Cough    Epoxy Resin Other (comments)    Losartan Cough    Other Medication Rash and Itching     Epoxy resin    Pravastatin Myalgia    Tape [Adhesive] Unknown (comments)       Family History   Problem Relation Age of Onset    Diabetes Mother     Kidney Disease Father     Cancer Father         Father  of renal cancer    Diabetes Brother        Current Outpatient Medications   Medication Sig Dispense Refill    rosuvastatin (CRESTOR) 10 mg tablet TAKE 1 TAB BY MOUTH DAILY. 90 Tab 0    cyclobenzaprine (FLEXERIL) 10 mg tablet TAKE 1 TABLET BY MOUTH 3 TIMES A DAY AS NEEDED FOR PAIN/STIFFNESS 30 Tab 1    valsartan-hydroCHLOROthiazide (DIOVAN-HCT) 160-12.5 mg per tablet Take 1 Tab by mouth daily.  90 Tab 1    levoFLOXacin (LEVAQUIN) 750 mg tablet Take 1 tablet 2 hours prior to procedure 1 Tab 0    propranolol LA (INDERAL LA) 80 mg SR capsule Take 1 Cap by mouth daily. 90 Cap 3    sildenafil, antihypertensive, (REVATIO) 20 mg tablet TAKE 1 TABLET BY MOUTH AS DIRECTED 30 Tab 9    LORazepam (ATIVAN) 0.5 mg tablet Take 1 Tab by mouth two (2) times daily as needed. Max Daily Amount: 1 mg. 60 Tab 1    fexofenadine (ALLEGRA) 180 mg tablet Take  by mouth daily as needed for Allergies.  desonide (TRIDESILON) 0.05 % cream APPLY TO AFFECTED AREAS TWICE A DAY FOR 3 DAYS CYCLES THEN STOP FOR 5 DAYS  5    ascorbic acid (VITAMIN C) 1,000 mg tablet 1,000 mg.  therapeutic multivitamin-minerals (THERAGRAN-M) tablet Take 1 Tab by Mouth Once a Day.  fluticasone (FLONASE) 50 mcg/actuation nasal spray 2 Sprays by Both Nostrils route daily as needed.  aspirin 81 mg tablet Take 81 mg by mouth daily. Visit Vitals  /80 (BP 1 Location: Right arm, BP Patient Position: Sitting)   Pulse 85   Temp 98.7 °F (37.1 °C) (Tympanic)   Resp 16   Ht 5' 7\" (1.702 m)   Wt 196 lb (88.9 kg)   SpO2 97%   BMI 30.70 kg/m²       PE  Well nourished in NAD  HEENT:   OP: clear. Neck: supple w/o mass or bruits. Chest: clear. CV: RRR w/o m,r,g; pulses intact. Abd: soft, NT, w/o HSM or mass. Ext: w/o edema. Neuro: essential tremor    Assessment and Plan    Encounter Diagnoses   Name Primary?     Routine general medical examination at a health care facility Yes    Essential hypertension, benign     Mixed hyperlipidemia      HTN - controlled  hyperlipidemia - continue dietary/exercise efforts  PC - active surveillance  Sanders advised  No change in rx  OV 6-12  mos or prn  I have explained plan to patient and the patient verbalizes understanding

## 2019-07-29 RX ORDER — CYCLOBENZAPRINE HCL 10 MG
TABLET ORAL
Qty: 30 TAB | Refills: 1 | Status: SHIPPED | OUTPATIENT
Start: 2019-07-29 | End: 2020-07-22 | Stop reason: SDUPTHER

## 2019-08-09 PROBLEM — S50.00XA CONTUSION OF ELBOW: Status: ACTIVE | Noted: 2019-08-09

## 2019-08-09 PROBLEM — S90.519A ABRASION OF ANKLE: Status: ACTIVE | Noted: 2019-08-09

## 2019-08-22 ENCOUNTER — HOSPITAL ENCOUNTER (OUTPATIENT)
Dept: LAB | Age: 69
Discharge: HOME | End: 2019-08-22
Payer: SELF-PAY

## 2019-08-22 ENCOUNTER — OFFICE VISIT (OUTPATIENT)
Dept: FAMILY MEDICINE CLINIC | Age: 69
End: 2019-08-22

## 2019-08-22 VITALS
DIASTOLIC BLOOD PRESSURE: 80 MMHG | HEART RATE: 85 BPM | OXYGEN SATURATION: 97 % | TEMPERATURE: 98.7 F | HEIGHT: 67 IN | RESPIRATION RATE: 16 BRPM | BODY MASS INDEX: 30.76 KG/M2 | WEIGHT: 196 LBS | SYSTOLIC BLOOD PRESSURE: 134 MMHG

## 2019-08-22 DIAGNOSIS — Z00.00 ROUTINE GENERAL MEDICAL EXAMINATION AT A HEALTH CARE FACILITY: Primary | ICD-10-CM

## 2019-08-22 DIAGNOSIS — I10 ESSENTIAL HYPERTENSION, BENIGN: ICD-10-CM

## 2019-08-22 DIAGNOSIS — E78.2 MIXED HYPERLIPIDEMIA: ICD-10-CM

## 2019-08-22 DIAGNOSIS — F41.9 ANXIETY: ICD-10-CM

## 2019-08-22 LAB
ALBUMIN SERPL-MCNC: 4.2 G/DL (ref 3.4–5)
ALBUMIN/GLOB SERPL: 1.3 {RATIO} (ref 0.8–1.7)
ALP SERPL-CCNC: 101 U/L (ref 45–117)
ALT SERPL-CCNC: 41 U/L (ref 16–61)
ANION GAP SERPL CALC-SCNC: 6 MMOL/L (ref 3–18)
AST SERPL-CCNC: 31 U/L (ref 10–38)
BASOPHILS # BLD: 0.1 K/UL (ref 0–0.1)
BASOPHILS NFR BLD: 1 % (ref 0–2)
BILIRUB SERPL-MCNC: 0.3 MG/DL (ref 0.2–1)
BUN SERPL-MCNC: 26 MG/DL (ref 7–18)
BUN/CREAT SERPL: 21 (ref 12–20)
CALCIUM SERPL-MCNC: 9.1 MG/DL (ref 8.5–10.1)
CHLORIDE SERPL-SCNC: 100 MMOL/L (ref 100–111)
CO2 SERPL-SCNC: 28 MMOL/L (ref 21–32)
CREAT SERPL-MCNC: 1.26 MG/DL (ref 0.6–1.3)
DIFFERENTIAL METHOD BLD: ABNORMAL
EOSINOPHIL # BLD: 0.8 K/UL (ref 0–0.4)
EOSINOPHIL NFR BLD: 9 % (ref 0–5)
ERYTHROCYTE [DISTWIDTH] IN BLOOD BY AUTOMATED COUNT: 11.9 % (ref 11.6–14.5)
GLOBULIN SER CALC-MCNC: 3.2 G/DL (ref 2–4)
GLUCOSE SERPL-MCNC: 101 MG/DL (ref 74–99)
HCT VFR BLD AUTO: 43.8 % (ref 36–48)
HGB BLD-MCNC: 14.8 G/DL (ref 13–16)
LYMPHOCYTES # BLD: 2.3 K/UL (ref 0.9–3.6)
LYMPHOCYTES NFR BLD: 25 % (ref 21–52)
MCH RBC QN AUTO: 33.7 PG (ref 24–34)
MCHC RBC AUTO-ENTMCNC: 33.8 G/DL (ref 31–37)
MCV RBC AUTO: 99.8 FL (ref 74–97)
MONOCYTES # BLD: 0.5 K/UL (ref 0.05–1.2)
MONOCYTES NFR BLD: 6 % (ref 3–10)
NEUTS SEG # BLD: 5.5 K/UL (ref 1.8–8)
NEUTS SEG NFR BLD: 59 % (ref 40–73)
PLATELET # BLD AUTO: 255 K/UL (ref 135–420)
PMV BLD AUTO: 10.9 FL (ref 9.2–11.8)
POTASSIUM SERPL-SCNC: 4.4 MMOL/L (ref 3.5–5.5)
PROT SERPL-MCNC: 7.4 G/DL (ref 6.4–8.2)
RBC # BLD AUTO: 4.39 M/UL (ref 4.7–5.5)
SODIUM SERPL-SCNC: 134 MMOL/L (ref 136–145)
WBC # BLD AUTO: 9.3 K/UL (ref 4.6–13.2)

## 2019-08-22 PROCEDURE — 36415 COLL VENOUS BLD VENIPUNCTURE: CPT

## 2019-08-22 PROCEDURE — 80053 COMPREHEN METABOLIC PANEL: CPT

## 2019-08-22 PROCEDURE — 80061 LIPID PANEL: CPT

## 2019-08-22 PROCEDURE — 85025 COMPLETE CBC W/AUTO DIFF WBC: CPT

## 2019-08-22 RX ORDER — VALSARTAN AND HYDROCHLOROTHIAZIDE 160; 12.5 MG/1; MG/1
1 TABLET, FILM COATED ORAL DAILY
Qty: 90 TAB | Refills: 3 | Status: SHIPPED | OUTPATIENT
Start: 2019-08-22 | End: 2020-08-25 | Stop reason: SDUPTHER

## 2019-08-22 RX ORDER — LORAZEPAM 0.5 MG/1
TABLET ORAL
Qty: 60 TAB | Refills: 0 | Status: SHIPPED | OUTPATIENT
Start: 2019-08-22 | End: 2020-03-12 | Stop reason: SDUPTHER

## 2019-08-22 RX ORDER — ROSUVASTATIN CALCIUM 10 MG/1
10 TABLET, COATED ORAL DAILY
Qty: 90 TAB | Refills: 3 | Status: SHIPPED | OUTPATIENT
Start: 2019-08-22 | End: 2020-08-25 | Stop reason: SDUPTHER

## 2019-08-22 NOTE — PATIENT INSTRUCTIONS
DASH Diet: Care Instructions  Your Care Instructions    The DASH diet is an eating plan that can help lower your blood pressure. DASH stands for Dietary Approaches to Stop Hypertension. Hypertension is high blood pressure. The DASH diet focuses on eating foods that are high in calcium, potassium, and magnesium. These nutrients can lower blood pressure. The foods that are highest in these nutrients are fruits, vegetables, low-fat dairy products, nuts, seeds, and legumes. But taking calcium, potassium, and magnesium supplements instead of eating foods that are high in those nutrients does not have the same effect. The DASH diet also includes whole grains, fish, and poultry. The DASH diet is one of several lifestyle changes your doctor may recommend to lower your high blood pressure. Your doctor may also want you to decrease the amount of sodium in your diet. Lowering sodium while following the DASH diet can lower blood pressure even further than just the DASH diet alone. Follow-up care is a key part of your treatment and safety. Be sure to make and go to all appointments, and call your doctor if you are having problems. It's also a good idea to know your test results and keep a list of the medicines you take. How can you care for yourself at home? Following the DASH diet  · Eat 4 to 5 servings of fruit each day. A serving is 1 medium-sized piece of fruit, ½ cup chopped or canned fruit, 1/4 cup dried fruit, or 4 ounces (½ cup) of fruit juice. Choose fruit more often than fruit juice. · Eat 4 to 5 servings of vegetables each day. A serving is 1 cup of lettuce or raw leafy vegetables, ½ cup of chopped or cooked vegetables, or 4 ounces (½ cup) of vegetable juice. Choose vegetables more often than vegetable juice. · Get 2 to 3 servings of low-fat and fat-free dairy each day. A serving is 8 ounces of milk, 1 cup of yogurt, or 1 ½ ounces of cheese. · Eat 6 to 8 servings of grains each day.  A serving is 1 slice of bread, 1 ounce of dry cereal, or ½ cup of cooked rice, pasta, or cooked cereal. Try to choose whole-grain products as much as possible. · Limit lean meat, poultry, and fish to 2 servings each day. A serving is 3 ounces, about the size of a deck of cards. · Eat 4 to 5 servings of nuts, seeds, and legumes (cooked dried beans, lentils, and split peas) each week. A serving is 1/3 cup of nuts, 2 tablespoons of seeds, or ½ cup of cooked beans or peas. · Limit fats and oils to 2 to 3 servings each day. A serving is 1 teaspoon of vegetable oil or 2 tablespoons of salad dressing. · Limit sweets and added sugars to 5 servings or less a week. A serving is 1 tablespoon jelly or jam, ½ cup sorbet, or 1 cup of lemonade. · Eat less than 2,300 milligrams (mg) of sodium a day. If you limit your sodium to 1,500 mg a day, you can lower your blood pressure even more. Tips for success  · Start small. Do not try to make dramatic changes to your diet all at once. You might feel that you are missing out on your favorite foods and then be more likely to not follow the plan. Make small changes, and stick with them. Once those changes become habit, add a few more changes. · Try some of the following:  ? Make it a goal to eat a fruit or vegetable at every meal and at snacks. This will make it easy to get the recommended amount of fruits and vegetables each day. ? Try yogurt topped with fruit and nuts for a snack or healthy dessert. ? Add lettuce, tomato, cucumber, and onion to sandwiches. ? Combine a ready-made pizza crust with low-fat mozzarella cheese and lots of vegetable toppings. Try using tomatoes, squash, spinach, broccoli, carrots, cauliflower, and onions. ? Have a variety of cut-up vegetables with a low-fat dip as an appetizer instead of chips and dip. ? Sprinkle sunflower seeds or chopped almonds over salads. Or try adding chopped walnuts or almonds to cooked vegetables.   ? Try some vegetarian meals using beans and peas. Add garbanzo or kidney beans to salads. Make burritos and tacos with mashed forte beans or black beans. Where can you learn more? Go to http://abhishek-janna.info/. Enter V288 in the search box to learn more about \"DASH Diet: Care Instructions. \"  Current as of: July 22, 2018  Content Version: 12.1  © 2190-8863 [employer], [employer]. Care instructions adapted under license by [facility] (which disclaims liability or warranty for this information). If you have questions about a medical condition or this instruction, always ask your healthcare professional. [address] any warranty or liability for your use of this information.

## 2019-08-22 NOTE — PROGRESS NOTES
Chief Complaint   Patient presents with    Complete Physical       Pt preferred language for health care discussion is english. Is someone accompanying this pt? no    Is the patient using any DME equipment during [address]? no    Depression Screening:  3 most recent PHQ Screens 8/21/2018 8/18/2017 6/20/2016 8/18/2015   Little interest or pleasure in doing things Not at all Not at all Not at all Not at all   Feeling down, depressed, irritable, or hopeless Not at all Not at all Not at all Not at all   Total Score PHQ 2 0 0 0 0       Learning Assessment:  Learning Assessment 12/8/2014   PRIMARY LEARNER Patient   PRIMARY LANGUAGE ENGLISH   LEARNER PREFERENCE PRIMARY LISTENING       Abuse Screening:  Abuse Screening Questionnaire 1/5/2017   Do you ever feel afraid of your partner? N   Are you in a relationship with someone who physically or mentally threatens you? N   Is it safe for you to go home? Y       Fall Risk  Fall Risk Assessment, last 12 mths 8/21/2018   Able to walk? Yes   Fall in past 12 months? No           Advance Directive:  1. Do you have an advance directive in place? Patient Reply:no    2. If not, would you like material regarding how to put one in place? Patient Reply: no      Coordination of Care:  1. Have you been to the ER, urgent care clinic since your last visit? Hospitalized since your last visit? no    2. Have you seen or consulted any other health care providers outside of the [address] [place] [address] since your last visit? Include any pap smears or colon screening.  no    Provider prefers to do his own med reconciliation

## 2019-08-23 LAB
CHOLEST SERPL-MCNC: 180 MG/DL
HDLC SERPL-MCNC: 30 MG/DL (ref 40–60)
HDLC SERPL: 6 {RATIO} (ref 0–5)
LDLC SERPL CALC-MCNC: ABNORMAL MG/DL (ref 0–100)
LIPID PROFILE,FLP: ABNORMAL
TRIGL SERPL-MCNC: 669 MG/DL (ref ?–150)
VLDLC SERPL CALC-MCNC: ABNORMAL MG/DL

## 2019-09-11 ENCOUNTER — TELEPHONE (OUTPATIENT)
Dept: FAMILY MEDICINE CLINIC | Age: 69
End: 2019-09-11

## 2019-09-11 NOTE — TELEPHONE ENCOUNTER
Pt dropped off [facility] physician results form for completion.     Stated Dr Anand Rowley does this for him yearly     Form to be given to Nurse Karla Arrieta

## 2019-10-20 RX ORDER — PROPRANOLOL HYDROCHLORIDE 80 MG/1
CAPSULE, EXTENDED RELEASE ORAL
Qty: 90 CAP | Refills: 3 | Status: SHIPPED | OUTPATIENT
Start: 2019-10-20 | End: 2020-03-30 | Stop reason: SDUPTHER

## 2020-03-12 DIAGNOSIS — F41.9 ANXIETY: ICD-10-CM

## 2020-03-13 RX ORDER — LORAZEPAM 0.5 MG/1
0.5 TABLET ORAL
Qty: 60 TAB | Refills: 0 | Status: SHIPPED | OUTPATIENT
Start: 2020-03-13 | End: 2020-10-15 | Stop reason: SDUPTHER

## 2020-03-31 RX ORDER — PROPRANOLOL HYDROCHLORIDE 80 MG/1
80 CAPSULE, EXTENDED RELEASE ORAL DAILY
Qty: 90 CAP | Refills: 3 | Status: SHIPPED | OUTPATIENT
Start: 2020-03-31 | End: 2021-02-11 | Stop reason: SDUPTHER

## 2020-07-22 RX ORDER — CYCLOBENZAPRINE HCL 10 MG
10 TABLET ORAL
Qty: 30 TAB | Refills: 1 | Status: SHIPPED | OUTPATIENT
Start: 2020-07-22 | End: 2021-04-13 | Stop reason: SDUPTHER

## 2020-08-03 DIAGNOSIS — Z11.59 NEED FOR HEPATITIS C SCREENING TEST: ICD-10-CM

## 2020-08-03 DIAGNOSIS — I10 ESSENTIAL HYPERTENSION, BENIGN: Primary | ICD-10-CM

## 2020-08-14 NOTE — PROGRESS NOTES
Linh Herrera is a 69 y.o. male who was seen by synchronous (real-time) audio-video technology on 8/25/2020 for Hypertension        Assessment & Plan:   Diagnoses and all orders for this visit:    1. Essential hypertension, benign    2. Mixed hyperlipidemia      HTN/hyperlipidemia - continue dietary/exercise efforts  No change in rx  OV 6 mos or prn  I have explained plan to patient and the patient verbalizes understanding    I spent at least 15 minutes on this visit with this established patient. 712  Subjective:   F/u visit for routine evaluation of HTN,  hyperlipidemia  Chol 180 August 2019; recent chol 154  Sees urology for active surveillance for Gl 6 PC  No acute issues  Doing well w/o chest/abdominal pain, dyspnea, cough, fever, evidence of GI/ hemorrhage  30 # volitional wt loss since last year (better diet/more exercise)      Prior to Admission medications    Medication Sig Start Date End Date Taking? Authorizing Provider   cyclobenzaprine (FLEXERIL) 10 mg tablet Take 1 Tab by mouth three (3) times daily as needed for Muscle Spasm(s). 7/22/20   Severa Peaches, MD   propranolol LA (INDERAL LA) 80 mg SR capsule Take 1 Cap by mouth daily. 3/31/20   Severa Peaches, MD   LORazepam (Ativan) 0.5 mg tablet Take 1 Tab by mouth two (2) times daily as needed for Anxiety. Max Daily Amount: 1 mg. 3/13/20   Severa Peaches, MD   tadalafil (CIALIS) 20 mg tablet Take 1 tablet PRN intercourse 8/23/19   Tammie Ibarra PA-C   rosuvastatin (CRESTOR) 10 mg tablet Take 1 Tab by mouth daily. 8/22/19   Severa Peaches, MD   valsartan-hydroCHLOROthiazide (DIOVAN-HCT) 160-12.5 mg per tablet Take 1 Tab by mouth daily. 8/22/19   Severa Peaches, MD   levoFLOXacin (LEVAQUIN) 750 mg tablet Take 1 tablet 2 hours prior to procedure 3/6/19   Tammie Ibarra PA-C   fexofenadine (ALLEGRA) 180 mg tablet Take  by mouth daily as needed for Allergies.     Provider, Romero   desonide (TRIDESILON) 0.05 % cream APPLY TO AFFECTED AREAS TWICE A DAY FOR 3 DAYS CYCLES THEN STOP FOR 5 DAYS 9/8/16   Provider, Historical   ascorbic acid (VITAMIN C) 1,000 mg tablet 1,000 mg. Provider, Historical   therapeutic multivitamin-minerals (THERAGRAN-M) tablet Take 1 Tab by Mouth Once a Day. 1/14/14   Provider, Historical   fluticasone (FLONASE) 50 mcg/actuation nasal spray 2 Sprays by Both Nostrils route daily as needed. Provider, Historical   aspirin 81 mg tablet Take 81 mg by mouth daily. Provider, Historical     Current Outpatient Medications   Medication Sig Dispense Refill    cyclobenzaprine (FLEXERIL) 10 mg tablet Take 1 Tab by mouth three (3) times daily as needed for Muscle Spasm(s). 30 Tab 1    propranolol LA (INDERAL LA) 80 mg SR capsule Take 1 Cap by mouth daily. 90 Cap 3    LORazepam (Ativan) 0.5 mg tablet Take 1 Tab by mouth two (2) times daily as needed for Anxiety. Max Daily Amount: 1 mg. 60 Tab 0    tadalafil (CIALIS) 20 mg tablet Take 1 tablet PRN intercourse 30 Tab 3    rosuvastatin (CRESTOR) 10 mg tablet Take 1 Tab by mouth daily. 90 Tab 3    valsartan-hydroCHLOROthiazide (DIOVAN-HCT) 160-12.5 mg per tablet Take 1 Tab by mouth daily. 90 Tab 3    levoFLOXacin (LEVAQUIN) 750 mg tablet Take 1 tablet 2 hours prior to procedure 1 Tab 0    fexofenadine (ALLEGRA) 180 mg tablet Take  by mouth daily as needed for Allergies.  desonide (TRIDESILON) 0.05 % cream APPLY TO AFFECTED AREAS TWICE A DAY FOR 3 DAYS CYCLES THEN STOP FOR 5 DAYS  5    ascorbic acid (VITAMIN C) 1,000 mg tablet 1,000 mg.  therapeutic multivitamin-minerals (THERAGRAN-M) tablet Take 1 Tab by Mouth Once a Day.  fluticasone (FLONASE) 50 mcg/actuation nasal spray 2 Sprays by Both Nostrils route daily as needed.  aspirin 81 mg tablet Take 81 mg by mouth daily.          Allergies   Allergen Reactions    Other Food Unable to [employer]     Accupril [Quinapril] Cough    Epoxy Resin Other (comments)    Losartan Cough    Other Medication Rash and Itching     Epoxy resin    Pravastatin Myalgia    Tape [Adhesive] Unknown (comments)     Past Medical History:   Diagnosis Date    Anxiety     BPH (benign prostatic hypertrophy)     Cardiac echocardiogram 08/04/2015    EF 58%. No WMA. Gr 2 DDfx. Mild LAE. Mild CARLENE. Mild MR. Mild TR.  Cardiac treadmill stress test 09/26/2012    [facility]:  No ischemic EKG changes. Total exercise time 8:22.  Coronary calcium score 09/26/2012    [facility]:  Calcium score 1337    Diverticulosis 3-2005    Essential tremor     Generalized osteoarthrosis, involving multiple sites     Hypertension     Impotence of organic origin     Microscopic hematuria     Mixed hyperlipidemia     Personal history of prostate cancer     Prostate cancer ([facility] [address]     [id number] [place] 3+3 prostate cancer in 5/12 cores (LA, LM, LLA, LLM, LLB) involving 10-50% of the specimen, presenting PSA: 3.13 ng/mL    Prostate nodule     Unspecified venous (peripheral) insufficiency      Past Surgical History:   Procedure Laterality Date    HX ACL RECONSTRUCTION      right    HX CERVICAL FUSION  8/2013    3 procedures through 10/2014    HX COLONOSCOPY  3-2005 virtual 2011    HX CYST REMOVAL      HX KNEE ARTHROSCOPY Left     HX UROLOGICAL  12/23/2016    Prostate Biopsy With Dr. Li Singleton per HPI    Objective:   No flowsheet data found. General: alert, cooperative, no distress   Mental  status: normal mood, behavior, speech, dress, motor activity, and thought processes, able to follow commands   HENT: NCAT   Neck: no visualized mass   Resp: no respiratory distress   Neuro: no gross deficits   Skin: no discoloration or lesions of concern on visible areas   Psychiatric: normal affect, consistent with stated mood, no evidence of hallucinations     Additional exam findings: no      We discussed the expected course, resolution and complications of the diagnosis(es) in detail.   Medication risks, benefits, costs, interactions, and alternatives were discussed as indicated. I advised him to contact the office if his condition worsens, changes or fails to improve as anticipated. He expressed understanding with the diagnosis(es) and plan. Peg Knutson, who was evaluated through a patient-initiated, synchronous (real-time) audio-video encounter, and/or his healthcare decision maker, is aware that it is a billable service, with coverage as determined by his insurance carrier. He provided verbal consent to proceed: Yes, and patient identification was verified. It was conducted pursuant to the emergency declaration under the [address] [place] [address] authority and the [employer] Act. A caregiver was present when appropriate. Ability to conduct physical exam was limited. I was at home. The patient was at home.       Alie Dutta MD

## 2020-08-17 ENCOUNTER — HOSPITAL ENCOUNTER (OUTPATIENT)
Dept: LAB | Age: 70
Discharge: HOME | End: 2020-08-17
Payer: COMMERCIAL

## 2020-08-17 DIAGNOSIS — I10 ESSENTIAL HYPERTENSION, BENIGN: ICD-10-CM

## 2020-08-17 DIAGNOSIS — Z11.59 NEED FOR HEPATITIS C SCREENING TEST: ICD-10-CM

## 2020-08-17 LAB
ALBUMIN SERPL-MCNC: 4 G/DL (ref 3.4–5)
ALBUMIN/GLOB SERPL: 1.3 {RATIO} (ref 0.8–1.7)
ALP SERPL-CCNC: 103 U/L (ref 45–117)
ALT SERPL-CCNC: 23 U/L (ref 16–61)
ANION GAP SERPL CALC-SCNC: 4 MMOL/L (ref 3–18)
AST SERPL-CCNC: 19 U/L (ref 10–38)
BASOPHILS # BLD: 0.1 K/UL (ref 0–0.1)
BASOPHILS NFR BLD: 2 % (ref 0–2)
BILIRUB SERPL-MCNC: 0.5 MG/DL (ref 0.2–1)
BUN SERPL-MCNC: 29 MG/DL (ref 7–18)
BUN/CREAT SERPL: 19 (ref 12–20)
CALCIUM SERPL-MCNC: 9.2 MG/DL (ref 8.5–10.1)
CHLORIDE SERPL-SCNC: 106 MMOL/L (ref 100–111)
CHOLEST SERPL-MCNC: 154 MG/DL
CO2 SERPL-SCNC: 30 MMOL/L (ref 21–32)
CREAT SERPL-MCNC: 1.54 MG/DL (ref 0.6–1.3)
DIFFERENTIAL METHOD BLD: ABNORMAL
EOSINOPHIL # BLD: 1 K/UL (ref 0–0.4)
EOSINOPHIL NFR BLD: 14 % (ref 0–5)
ERYTHROCYTE [DISTWIDTH] IN BLOOD BY AUTOMATED COUNT: 12.2 % (ref 11.6–14.5)
GLOBULIN SER CALC-MCNC: 3.2 G/DL (ref 2–4)
GLUCOSE SERPL-MCNC: 109 MG/DL (ref 74–99)
HCT VFR BLD AUTO: 41.4 % (ref 36–48)
HDLC SERPL-MCNC: 52 MG/DL (ref 40–60)
HDLC SERPL: 3 {RATIO} (ref 0–5)
HGB BLD-MCNC: 13.6 G/DL (ref 13–16)
LDLC SERPL CALC-MCNC: 83.4 MG/DL (ref 0–100)
LIPID PROFILE,FLP: NORMAL
LYMPHOCYTES # BLD: 2 K/UL (ref 0.9–3.6)
LYMPHOCYTES NFR BLD: 28 % (ref 21–52)
MCH RBC QN AUTO: 32.9 PG (ref 24–34)
MCHC RBC AUTO-ENTMCNC: 32.9 G/DL (ref 31–37)
MCV RBC AUTO: 100 FL (ref 74–97)
MONOCYTES # BLD: 0.6 K/UL (ref 0.05–1.2)
MONOCYTES NFR BLD: 9 % (ref 3–10)
NEUTS SEG # BLD: 3.4 K/UL (ref 1.8–8)
NEUTS SEG NFR BLD: 47 % (ref 40–73)
PLATELET # BLD AUTO: 220 K/UL (ref 135–420)
PMV BLD AUTO: 11.1 FL (ref 9.2–11.8)
POTASSIUM SERPL-SCNC: 5.1 MMOL/L (ref 3.5–5.5)
PROT SERPL-MCNC: 7.2 G/DL (ref 6.4–8.2)
RBC # BLD AUTO: 4.14 M/UL (ref 4.7–5.5)
SODIUM SERPL-SCNC: 140 MMOL/L (ref 136–145)
TRIGL SERPL-MCNC: 93 MG/DL (ref ?–150)
VLDLC SERPL CALC-MCNC: 18.6 MG/DL
WBC # BLD AUTO: 7 K/UL (ref 4.6–13.2)

## 2020-08-17 PROCEDURE — 80061 LIPID PANEL: CPT

## 2020-08-17 PROCEDURE — 36415 COLL VENOUS BLD VENIPUNCTURE: CPT

## 2020-08-17 PROCEDURE — 80053 COMPREHEN METABOLIC PANEL: CPT

## 2020-08-17 PROCEDURE — 85025 COMPLETE CBC W/AUTO DIFF WBC: CPT

## 2020-08-17 PROCEDURE — 86803 HEPATITIS C AB TEST: CPT

## 2020-08-18 LAB
HCV AB SER IA-ACNC: 0.04 INDEX
HCV AB SERPL QL IA: NEGATIVE
HCV COMMENT,HCGAC: NORMAL

## 2020-08-25 ENCOUNTER — VIRTUAL VISIT (OUTPATIENT)
Dept: FAMILY MEDICINE CLINIC | Age: 70
End: 2020-08-25

## 2020-08-25 DIAGNOSIS — E78.2 MIXED HYPERLIPIDEMIA: ICD-10-CM

## 2020-08-25 DIAGNOSIS — I10 ESSENTIAL HYPERTENSION, BENIGN: Primary | ICD-10-CM

## 2020-08-25 RX ORDER — ROSUVASTATIN CALCIUM 10 MG/1
10 TABLET, COATED ORAL DAILY
Qty: 90 TAB | Refills: 3 | Status: SHIPPED | OUTPATIENT
Start: 2020-08-25

## 2020-08-25 RX ORDER — VALSARTAN AND HYDROCHLOROTHIAZIDE 160; 12.5 MG/1; MG/1
1 TABLET, FILM COATED ORAL DAILY
Qty: 90 TAB | Refills: 3 | Status: SHIPPED | OUTPATIENT
Start: 2020-08-25

## 2020-10-12 ENCOUNTER — OFFICE VISIT (OUTPATIENT)
Dept: CARDIOLOGY CLINIC | Age: 70
End: 2020-10-12
Payer: COMMERCIAL

## 2020-10-12 VITALS
HEIGHT: 67 IN | BODY MASS INDEX: 28.19 KG/M2 | OXYGEN SATURATION: 98 % | HEART RATE: 68 BPM | DIASTOLIC BLOOD PRESSURE: 76 MMHG | SYSTOLIC BLOOD PRESSURE: 132 MMHG | WEIGHT: 179.6 LBS

## 2020-10-12 DIAGNOSIS — I25.10 CORONARY ARTERY DISEASE INVOLVING NATIVE CORONARY ARTERY OF NATIVE HEART WITHOUT ANGINA PECTORIS: ICD-10-CM

## 2020-10-12 DIAGNOSIS — I25.10 CORONARY ARTERY DISEASE INVOLVING NATIVE CORONARY ARTERY OF NATIVE HEART WITHOUT ANGINA PECTORIS: Primary | ICD-10-CM

## 2020-10-12 DIAGNOSIS — R93.1 AGATSTON CORONARY ARTERY CALCIUM SCORE GREATER THAN 400: ICD-10-CM

## 2020-10-12 DIAGNOSIS — I10 ESSENTIAL HYPERTENSION, BENIGN: ICD-10-CM

## 2020-10-12 DIAGNOSIS — E78.2 MIXED HYPERLIPIDEMIA: ICD-10-CM

## 2020-10-12 PROCEDURE — 93000 ELECTROCARDIOGRAM COMPLETE: CPT | Performed by: INTERNAL MEDICINE

## 2020-10-12 PROCEDURE — 99214 OFFICE O/P EST MOD 30 MIN: CPT | Performed by: INTERNAL MEDICINE

## 2020-10-12 RX ORDER — AA/PROT/LYSINE/METHIO/VIT C/B6 50-12.5 MG
1 TABLET ORAL DAILY
COMMUNITY

## 2020-10-12 RX ORDER — GLUCOSAMINE SULFATE 1500 MG
POWDER IN PACKET (EA) ORAL DAILY
COMMUNITY

## 2020-10-12 NOTE — PROGRESS NOTES
HISTORY OF PRESENT ILLNESS  Ethan Caballero is a 69 y.o. male. Hypertension   Pertinent negatives include no chest pain, no abdominal pain, no headaches and no shortness of breath. Follow-up   Pertinent negatives include no chest pain, no abdominal pain, no headaches and no shortness of breath. Patient presents for an add-on office visit. The patient has a past medical history significant for long-standing hypertension and dyslipidemia. He underwent a coronary calcium score in 2012, and was found to have a significantly elevated score of 1337, which is in the upper quartile. He also underwent an exercise treadmill stress test, which was a normal test at 8 minutes 22 seconds a total exercise time, which was negative for ischemia. The patient was last seen in the office over a year and a half ago. Since last visit, the patient has been feeling relatively well. He states he significantly changed his diet by cutting out alcohol completely and eating less. As result he lost almost 25 pounds in weight. He did gain some of the weight back during the COVID-19 pandemic. He denies any chest pain, shortness of breath or leg swelling. His biggest issues involve joint pains. Past Medical History:   Diagnosis Date    Anxiety     BPH (benign prostatic hypertrophy)     Cardiac echocardiogram 08/04/2015    EF 58%. No WMA. Gr 2 DDfx. Mild LAE. Mild CARLENE. Mild MR. Mild TR.  Cardiac treadmill stress test 09/26/2012    [facility]:  No ischemic EKG changes. Total exercise time 8:22.       Coronary calcium score 09/26/2012    [facility]:  Calcium score 1337    Diverticulosis 3-2005    Essential tremor     Generalized osteoarthrosis, involving multiple sites     Hypertension     Impotence of organic origin     Microscopic hematuria     Mixed hyperlipidemia     Personal history of prostate cancer     Prostate cancer ([facility] [address]     [id number] [place] 3+3 prostate cancer in 5/12 cores (LA, LM, LLA, LLM, LLB) involving 10-50% of the specimen, presenting PSA: 3.13 ng/mL    Prostate nodule     Unspecified venous (peripheral) insufficiency       Current Outpatient Medications   Medication Sig Dispense Refill    coenzyme q10 (Co Q-10) 10 mg cap Take  by mouth.  cholecalciferol (Vitamin D3) 25 mcg (1,000 unit) cap Take  by mouth daily.  ZINC PO Take  by mouth.  rosuvastatin (CRESTOR) 10 mg tablet Take 1 Tab by mouth daily. 90 Tab 3    valsartan-hydroCHLOROthiazide (DIOVAN-HCT) 160-12.5 mg per tablet Take 1 Tab by mouth daily. 90 Tab 3    cyclobenzaprine (FLEXERIL) 10 mg tablet Take 1 Tab by mouth three (3) times daily as needed for Muscle Spasm(s). 30 Tab 1    propranolol LA (INDERAL LA) 80 mg SR capsule Take 1 Cap by mouth daily. 90 Cap 3    LORazepam (Ativan) 0.5 mg tablet Take 1 Tab by mouth two (2) times daily as needed for Anxiety. Max Daily Amount: 1 mg. 60 Tab 0    tadalafil (CIALIS) 20 mg tablet Take 1 tablet PRN intercourse 30 Tab 3    desonide (TRIDESILON) 0.05 % cream APPLY TO AFFECTED AREAS TWICE A DAY FOR 3 DAYS CYCLES THEN STOP FOR 5 DAYS  5    ascorbic acid (VITAMIN C) 1,000 mg tablet 1,000 mg.  therapeutic multivitamin-minerals (THERAGRAN-M) tablet Take 1 Tab by Mouth Once a Day.  fluticasone (FLONASE) 50 mcg/actuation nasal spray 2 Sprays by Both Nostrils route daily as needed.  aspirin 81 mg tablet Take 81 mg by mouth daily.            Allergies   Allergen Reactions    Other Food Unable to Obtain     Tuna Fish     Accupril [Quinapril] Cough    Epoxy Resin Other (comments)    Losartan Cough    Other Medication Rash and Itching     Epoxy resin    Pravastatin Myalgia    Tape [Adhesive] Unknown (comments)      Social History     Tobacco Use    Smoking status: Never Smoker    Smokeless tobacco: Never Used   Substance Use Topics    Alcohol use: Yes     Comment: occasional glass of wine    Drug use: No            Review of Systems Constitutional: Negative for chills, fever and weight loss. HENT: Negative for nosebleeds. Eyes: Negative for blurred vision and double vision. Respiratory: Negative for cough, shortness of breath and wheezing. Cardiovascular: Negative for chest pain, palpitations, orthopnea, claudication, leg swelling and PND. Gastrointestinal: Negative for abdominal pain, heartburn, nausea and vomiting. Genitourinary: Negative for dysuria and hematuria. Musculoskeletal: Negative for falls, myalgias and neck pain. Skin: Negative for rash. Neurological: Negative for dizziness, focal weakness and headaches. Endo/Heme/Allergies: Does not bruise/bleed easily. Psychiatric/Behavioral: Negative for substance abuse. Visit Vitals  /76 (BP 1 Location: Right arm, BP Patient Position: Sitting)   Pulse 68   Ht 5' 7\" (1.702 m)   Wt 81.5 kg (179 lb 9.6 oz)   SpO2 98%   BMI 28.13 kg/m²      Physical Exam   Constitutional: He is oriented to person, place, and time. He appears well-developed and well-nourished. HENT:   Head: Normocephalic and atraumatic. Eyes: Conjunctivae are normal.   Neck: Neck supple. No JVD present. Carotid bruit is not present. Cardiovascular: Normal rate, regular rhythm, S1 normal, S2 normal and normal pulses. Exam reveals no gallop and no S3. No murmur heard. Pulmonary/Chest: Breath sounds normal. He has no wheezes. He has no rales. Abdominal: Soft. Bowel sounds are normal. There is no abdominal tenderness. Musculoskeletal:         General: No edema. Neurological: He is alert and oriented to person, place, and time. Skin: Skin is warm and dry. EKG:  Normal sinus rhythm, normal axis, normal  QTc interval, no ST or T wave abnormalities concerning for ischemia. Occasional PVC. Compared to the previous EKG, the PVC is new. ASSESSMENT and PLAN    Coronary artery disease. Nonobstructive by history of remote stress test.  No new symptoms concerning for angina.   Patient had an elevated coronary calcium score > 400 in 2012. I have recommended repeat exercise stress test for further risk ratification. Dyslipidemia. The patient is now taking Crestor 10 mg daily. He underwent a repeat lipid panel in August 2020 which looked reasonable but his numbers were a little higher than they were 2 years ago. Total cholesterol 154, triglycerides 93, HDL 51, LDL 83. I would continue this regimen for now. Essential hypertension. The patient's blood pressure has been reasonably well controlled on his current regimen, which I would continue. Chronic kidney disease, stage III. Recent creatinine of 1.5. This is followed by his PCP. As long as his stress test remains low risk, patient can follow-up in 12 months, sooner if needed.

## 2020-10-12 NOTE — PROGRESS NOTES
Dylan Benítez presents today for   Chief Complaint   Patient presents with    Follow-up     overdue 1 year follow up       Dylan Benítez preferred language for health care discussion is english/other. Is someone accompanying this pt? no    Is the patient using any DME equipment during [address]? no    Depression Screening:  3 most recent PHQ Screens 10/12/2020   Little interest or pleasure in doing things Not at all   Feeling down, depressed, irritable, or hopeless -   Total Score PHQ 2 -       Learning Assessment:  Learning Assessment 12/8/2014   PRIMARY LEARNER Patient   PRIMARY LANGUAGE ENGLISH   LEARNER PREFERENCE PRIMARY LISTENING       Abuse Screening:  Abuse Screening Questionnaire 10/12/2020   Do you ever feel afraid of your partner? N   Are you in a relationship with someone who physically or mentally threatens you? N   Is it safe for you to go home? Y       Fall Risk  Fall Risk Assessment, last 12 mths 10/12/2020   Able to walk? Yes   Fall in past 12 months? No       Pt currently taking Anticoagulant therapy? no    Coordination of Care:  1. Have you been to the ER, urgent care clinic since your last visit? Hospitalized since your last visit? no    2. Have you seen or consulted any other health care providers outside of the [address] [place] [address] since your last visit? Include any pap smears or colon screening.  no

## 2020-10-15 DIAGNOSIS — F41.9 ANXIETY: ICD-10-CM

## 2020-10-19 RX ORDER — LORAZEPAM 0.5 MG/1
0.5 TABLET ORAL
Qty: 60 TAB | Refills: 0 | Status: SHIPPED | OUTPATIENT
Start: 2020-10-19 | End: 2021-02-12 | Stop reason: SDUPTHER

## 2020-10-19 NOTE — TELEPHONE ENCOUNTER
Last Visit: 8/25/20 with MD Renea Fong  Next Appointment: Advised to follow-up in 6 months  Previous Refill Encounter(s): 3/13/20 #60    Requested Prescriptions     Pending Prescriptions Disp Refills    LORazepam (Ativan) 0.5 mg tablet 60 Tab 0     Sig: Take 1 Tab by mouth two (2) times daily as needed for Anxiety. Max Daily Amount: 1 mg.

## 2020-10-22 ENCOUNTER — TELEPHONE (OUTPATIENT)
Dept: CARDIOLOGY CLINIC | Age: 70
End: 2020-10-22

## 2020-10-23 LAB
STRESS ANGINA INDEX: 0
STRESS BASELINE DIAS BP: 80 MMHG
STRESS BASELINE HR: 75 BPM
STRESS BASELINE SYS BP: 130 MMHG
STRESS ESTIMATED WORKLOAD: 10 METS
STRESS EXERCISE DUR MIN: NORMAL MIN:SEC
STRESS PEAK DIAS BP: 70 MMHG
STRESS PEAK SYS BP: 150 MMHG
STRESS PERCENT HR ACHIEVED: 95 %
STRESS POST PEAK HR: 141 BPM
STRESS RATE PRESSURE PRODUCT: NORMAL BPM*MMHG
STRESS SR DUKE TREADMILL SCORE: 2
STRESS ST DEPRESSION: 1 MM
STRESS STAGE 1 BP: NORMAL MMHG
STRESS STAGE 1 DURATION: 3 MIN:SEC
STRESS STAGE 1 HR: 112 BPM
STRESS STAGE 2 BP: NORMAL MMHG
STRESS STAGE 2 DURATION: 3 MIN:SEC
STRESS STAGE 2 HR: 129 BPM
STRESS STAGE 3 DURATION: NORMAL MIN:SEC
STRESS STAGE 3 HR: 141 BPM
STRESS STAGE RECOVERY 1 BP: NORMAL MMHG
STRESS STAGE RECOVERY 1 DURATION: 2 MIN:SEC
STRESS STAGE RECOVERY 1 HR: 95 BPM
STRESS STAGE RECOVERY 2 BP: NORMAL MMHG
STRESS STAGE RECOVERY 2 DURATION: 4 MIN:SEC
STRESS STAGE RECOVERY 2 HR: 93 BPM
STRESS TARGET HR: 149 BPM

## 2020-10-26 ENCOUNTER — TELEPHONE (OUTPATIENT)
Dept: CARDIOLOGY CLINIC | Age: 70
End: 2020-10-26

## 2020-10-26 DIAGNOSIS — I25.10 CORONARY ARTERY DISEASE INVOLVING NATIVE CORONARY ARTERY OF NATIVE HEART WITHOUT ANGINA PECTORIS: ICD-10-CM

## 2020-10-26 DIAGNOSIS — I25.10 CORONARY ARTERY DISEASE INVOLVING NATIVE CORONARY ARTERY OF NATIVE HEART WITHOUT ANGINA PECTORIS: Primary | ICD-10-CM

## 2020-10-26 NOTE — TELEPHONE ENCOUNTER
----- Message from Ally Swain MD sent at 10/26/2020  9:01 AM EDT -----  Please let the patient know that his treadmill stress test was inconclusive due to nonspecific EKG changes. I would like to repeat the stress test with nuclear imaging if the patient agreeable.  ----- Message -----  From: Remington Valdivia RN  Sent: 10/26/2020   7:32 AM EDT  To: Ally Swain MD    Per your last note \" Coronary artery disease. Nonobstructive by history of remote stress test.  No new symptoms concerning for angina. Patient had an elevated coronary calcium score > 400 in 2012. I have recommended repeat exercise stress test for further risk ratification.

## 2020-10-26 NOTE — TELEPHONE ENCOUNTER
Patient aware of results and is agreeable for treadmill nuc   , order in chart and  will call to set it up

## 2020-10-26 NOTE — PROGRESS NOTES
Per your last note \" Coronary artery disease. Nonobstructive by history of remote stress test.  No new symptoms concerning for angina. Patient had an elevated coronary calcium score > 400 in 2012. I have recommended repeat exercise stress test for further risk ratification.

## 2020-11-06 LAB
STRESS ANGINA INDEX: 0
STRESS BASELINE DIAS BP: 80 MMHG
STRESS BASELINE HR: 83 BPM
STRESS BASELINE SYS BP: 142 MMHG
STRESS ESTIMATED WORKLOAD: 7 METS
STRESS EXERCISE DUR MIN: 6 MIN:SEC
STRESS PEAK DIAS BP: 80 MMHG
STRESS PEAK SYS BP: 160 MMHG
STRESS PERCENT HR ACHIEVED: 97 %
STRESS POST PEAK HR: 144 BPM
STRESS RATE PRESSURE PRODUCT: NORMAL BPM*MMHG
STRESS STAGE 1 BP: NORMAL MMHG
STRESS STAGE 1 DURATION: 3 MIN:SEC
STRESS STAGE 1 HR: 129 BPM
STRESS STAGE 2 BP: NORMAL MMHG
STRESS STAGE 2 DURATION: 3 MIN:SEC
STRESS STAGE 2 HR: 144 BPM
STRESS STAGE RECOVERY 1 BP: NORMAL MMHG
STRESS STAGE RECOVERY 1 DURATION: 2 MIN:SEC
STRESS STAGE RECOVERY 1 HR: 103 BPM
STRESS STAGE RECOVERY 2 BP: NORMAL MMHG
STRESS STAGE RECOVERY 2 DURATION: 4 MIN:SEC
STRESS STAGE RECOVERY 2 HR: 93 BPM
STRESS TARGET HR: 149 BPM

## 2020-11-09 NOTE — PROGRESS NOTES
Per your last result note \" Please let the patient know that his treadmill stress test was inconclusive due to nonspecific EKG changes. I would like to repeat the stress test with nuclear imaging if the patient agreeable.

## 2020-11-12 ENCOUNTER — TELEPHONE (OUTPATIENT)
Dept: CARDIOLOGY CLINIC | Age: 70
End: 2020-11-12

## 2020-11-12 NOTE — TELEPHONE ENCOUNTER
----- Message from Ally Swain MD sent at 11/9/2020  8:22 AM EST -----  Please let the patient know that his nuclear stress test was normal.  ----- Message -----  From: Remington Valdivia RN  Sent: 11/9/2020   6:57 AM EST  To: Ally Swain MD    Per your last result note \" Please let the patient know that his treadmill stress test was inconclusive due to nonspecific EKG changes. I would like to repeat the stress test with nuclear imaging if the patient agreeable.

## 2021-02-11 RX ORDER — PROPRANOLOL HYDROCHLORIDE 80 MG/1
80 CAPSULE, EXTENDED RELEASE ORAL DAILY
Qty: 90 CAP | Refills: 3 | Status: SHIPPED | OUTPATIENT
Start: 2021-02-11

## 2021-02-12 DIAGNOSIS — F41.9 ANXIETY: ICD-10-CM

## 2021-02-12 RX ORDER — LORAZEPAM 0.5 MG/1
0.5 TABLET ORAL
Qty: 60 TAB | Refills: 0 | Status: SHIPPED | OUTPATIENT
Start: 2021-02-12 | End: 2021-04-13 | Stop reason: SDUPTHER

## 2021-04-13 DIAGNOSIS — F41.9 ANXIETY: ICD-10-CM

## 2021-04-14 RX ORDER — LORAZEPAM 0.5 MG/1
0.5 TABLET ORAL
Qty: 60 TAB | Refills: 0 | Status: SHIPPED | OUTPATIENT
Start: 2021-04-14

## 2021-04-14 RX ORDER — CYCLOBENZAPRINE HCL 10 MG
10 TABLET ORAL
Qty: 30 TAB | Refills: 1 | Status: SHIPPED | OUTPATIENT
Start: 2021-04-14

## 2021-10-11 ENCOUNTER — OFFICE VISIT (OUTPATIENT)
Dept: CARDIOLOGY CLINIC | Age: 71
End: 2021-10-11
Payer: COMMERCIAL

## 2021-10-11 VITALS
HEART RATE: 75 BPM | SYSTOLIC BLOOD PRESSURE: 130 MMHG | WEIGHT: 193 LBS | DIASTOLIC BLOOD PRESSURE: 88 MMHG | HEIGHT: 67 IN | BODY MASS INDEX: 30.29 KG/M2 | OXYGEN SATURATION: 99 %

## 2021-10-11 DIAGNOSIS — I10 ESSENTIAL HYPERTENSION, BENIGN: ICD-10-CM

## 2021-10-11 DIAGNOSIS — E78.2 MIXED HYPERLIPIDEMIA: ICD-10-CM

## 2021-10-11 DIAGNOSIS — I25.10 CORONARY ARTERY DISEASE INVOLVING NATIVE CORONARY ARTERY OF NATIVE HEART WITHOUT ANGINA PECTORIS: Primary | ICD-10-CM

## 2021-10-11 DIAGNOSIS — R93.1 AGATSTON CORONARY ARTERY CALCIUM SCORE GREATER THAN 400: ICD-10-CM

## 2021-10-11 PROCEDURE — 99214 OFFICE O/P EST MOD 30 MIN: CPT | Performed by: INTERNAL MEDICINE

## 2021-10-11 PROCEDURE — 93000 ELECTROCARDIOGRAM COMPLETE: CPT | Performed by: INTERNAL MEDICINE

## 2021-10-11 NOTE — PROGRESS NOTES
Hans Kong presents today for   Chief Complaint   Patient presents with    Follow-up     1 year follow up       Hans Kong preferred language for health care discussion is english/other. Is someone accompanying this pt? no    Is the patient using any DME equipment during [address]? no    Depression Screening:  3 most recent PHQ Screens 10/11/2021   Little interest or pleasure in doing things Not at all   Feeling down, depressed, irritable, or hopeless Not at all   Total Score PHQ 2 0       Learning Assessment:  Learning Assessment 10/11/2021   PRIMARY LEARNER Patient   PRIMARY LANGUAGE ENGLISH   LEARNER PREFERENCE PRIMARY DEMONSTRATION   ANSWERED BY patient   RELATIONSHIP SELF       Abuse Screening:  Abuse Screening Questionnaire 10/11/2021   Do you ever feel afraid of your partner? N   Are you in a relationship with someone who physically or mentally threatens you? N   Is it safe for you to go home? Y       Fall Risk  Fall Risk Assessment, last 12 mths 10/11/2021   Able to walk? Yes   Fall in past 12 months? 0   Do you feel unsteady? 0   Are you worried about falling 0           Pt currently taking Anticoagulant therapy? no    Pt currently taking Antiplatelet therapy ? ASA 81 mg once a day      Coordination of Care:  1. Have you been to the ER, urgent care clinic since your last visit? Hospitalized since your last visit? no    2. Have you seen or consulted any other health care providers outside of the [address] [place] [address] since your last visit? Include any pap smears or colon screening.  no

## 2021-10-11 NOTE — PROGRESS NOTES
HISTORY OF PRESENT ILLNESS  Martir Camargo is a 66 y.o. male. Follow-up  Pertinent negatives include no chest pain, no abdominal pain, no headaches and no shortness of breath. Patient presents for a follow-up office visit. The patient has a past medical history significant for long-standing hypertension and dyslipidemia. He underwent a coronary calcium score in 2012, and was found to have a significantly elevated score of 1337, which is in the upper quartile. He also underwent an exercise treadmill stress test, which was a normal test at 8 minutes 22 seconds a total exercise time, which was negative for ischemia. The patient underwent a repeat exercise treadmill stress test in October 2020 which was mildly abnormal with 1 mm ST segment depressions but no symptoms of angina, so he then underwent an exercise nuclear stress test in November 2020 which is a normal and low risk study. No perfusion imaging abnormalities, EF greater than 70%. His EKG had similar changes without anginal symptoms. He was last seen in our office 1 year ago. Since last visit, he states his been feeling well. He admits he does not exercise as much as he had been in the past and gained about 10 to 15 pounds in weight. He denies any chest pain or shortness of breath, no leg swelling or claudication. Past Medical History:   Diagnosis Date    Anxiety     BPH (benign prostatic hypertrophy)     Cardiac echocardiogram 08/04/2015    EF 58%. No WMA. Gr 2 DDfx. Mild LAE. Mild CARLENE. Mild MR. Mild TR.  Cardiac treadmill stress test 09/26/2012    [facility]:  No ischemic EKG changes. Total exercise time 8:22.       Coronary calcium score 09/26/2012    [facility]:  Calcium score 1337    Diverticulosis 3-2005    Essential tremor     Generalized osteoarthrosis, involving multiple sites     Hypertension     Impotence of organic origin     Microscopic hematuria     Mixed hyperlipidemia     Personal history of prostate cancer     Prostate cancer ([facility] [address]     [id number] [place] 3+3 prostate cancer in 5/12 cores (LA, LM, LLA, LLM, LLB) involving 10-50% of the specimen, presenting PSA: 3.13 ng/mL    Prostate nodule     Unspecified venous (peripheral) insufficiency       Current Outpatient Medications   Medication Sig Dispense Refill    tadalafiL (CIALIS) 20 mg tablet Take 1 tablet PRN intercourse 30 Tab 3    cyclobenzaprine (FLEXERIL) 10 mg tablet Take 1 Tab by mouth three (3) times daily as needed for Muscle Spasm(s). 30 Tab 1    LORazepam (Ativan) 0.5 mg tablet Take 1 Tab by mouth two (2) times daily as needed for Anxiety. Max Daily Amount: 1 mg. 60 Tab 0    propranolol LA (INDERAL LA) 80 mg SR capsule Take 1 Cap by mouth daily. 90 Cap 3    coenzyme q10 (Co Q-10) 10 mg cap Take  by mouth.  cholecalciferol (Vitamin D3) 25 mcg (1,000 unit) cap Take  by mouth daily.  ZINC PO Take  by mouth.  rosuvastatin (CRESTOR) 10 mg tablet Take 1 Tab by mouth daily. 90 Tab 3    valsartan-hydroCHLOROthiazide (DIOVAN-HCT) 160-12.5 mg per tablet Take 1 Tab by mouth daily. 90 Tab 3    desonide (TRIDESILON) 0.05 % cream APPLY TO AFFECTED AREAS TWICE A DAY FOR 3 DAYS CYCLES THEN STOP FOR 5 DAYS  5    ascorbic acid, vitamin C, (Vitamin C) 1,000 mg tablet 1,000 mg.  therapeutic multivitamin-minerals (THERAGRAN-M) tablet Take 1 Tab by Mouth Once a Day.  fluticasone (FLONASE) 50 mcg/actuation nasal spray 2 Sprays by Both Nostrils route daily as needed.  aspirin 81 mg tablet Take 81 mg by mouth daily.            Allergies   Allergen Reactions    Other Food Unable to [employer]     Accupril [Quinapril] Cough    Epoxy Resin Other (comments)    Losartan Cough    Other Medication Rash and Itching     Epoxy resin    Pravastatin Myalgia    Tape [Adhesive] Unknown (comments)      Social History     Tobacco Use    Smoking status: Never Smoker    Smokeless tobacco: Never Used Substance Use Topics    Alcohol use: Yes     Comment: occasional glass of wine    Drug use: No            Review of Systems   Constitutional: Negative for chills, fever and weight loss. HENT: Negative for nosebleeds. Eyes: Negative for blurred vision and double vision. Respiratory: Negative for cough, shortness of breath and wheezing. Cardiovascular: Negative for chest pain, palpitations, orthopnea, claudication, leg swelling and PND. Gastrointestinal: Negative for abdominal pain, heartburn, nausea and vomiting. Genitourinary: Negative for dysuria and hematuria. Musculoskeletal: Negative for falls, myalgias and neck pain. Skin: Negative for rash. Neurological: Negative for dizziness, focal weakness and headaches. Endo/Heme/Allergies: Does not bruise/bleed easily. Psychiatric/Behavioral: Negative for substance abuse. Visit Vitals  /88 (BP 1 Location: Left upper arm, BP Patient Position: Sitting, BP Cuff Size: Small adult)   Pulse 75   Ht 5' 7\" (1.702 m)   Wt 87.5 kg (193 lb)   SpO2 99%   BMI 30.23 kg/m²      Physical Exam  Constitutional:       Appearance: He is well-developed. HENT:      Head: Normocephalic and atraumatic. Eyes:      Conjunctiva/sclera: Conjunctivae normal.   Neck:      Vascular: No carotid bruit or JVD. Cardiovascular:      Rate and Rhythm: Normal rate and regular rhythm. Pulses: Normal pulses. Heart sounds: S1 normal and S2 normal. No murmur heard. No gallop. No S3 sounds. Pulmonary:      Breath sounds: Normal breath sounds. No wheezing or rales. Abdominal:      General: Bowel sounds are normal.      Palpations: Abdomen is soft. Tenderness: There is no abdominal tenderness. Musculoskeletal:         General: No swelling or deformity. Cervical back: Neck supple. Skin:     General: Skin is warm and dry. Neurological:      General: No focal deficit present.       Mental Status: He is alert and oriented to person, place, and time.   Psychiatric:         Mood and Affect: Mood normal.       EKG:  Normal sinus rhythm, normal axis, normal  QTc interval, no ST or T wave abnormalities concerning for ischemia. Compared to the previous EKG, the PVCs are no longer present. ASSESSMENT and PLAN    Coronary artery disease. Nonobstructive in nature based on a low risk exercise nuclear stress test in November 2020. No perfusion imaging abnormalities, normal LVEF. Fair exercise tolerance at 6.0 seconds. No new symptoms concerning for angina. Patient had an elevated coronary calcium score > 400 in 2012. Patient remains on a potent statin and an aspirin and I have recommended that he resume regular exercise. Dyslipidemia. The patient is now taking Crestor 10 mg daily. He underwent a repeat lipid panel in May 2020 which looked reasonable but his numbers were a little higher than they were the previous year: Total cholesterol 164, triglycerides 144, HDL 45, LDL 86. I would continue this regimen for now. I have recommend that he lose weight and start exercising regularly. Essential hypertension. The patient's blood pressure has been reasonably well controlled on his current regimen, which I would continue. Chronic kidney disease, stage III. Recent creatinine of 1.5. This is followed by his PCP. Follow-up in 12 months, sooner if needed.

## 2022-03-19 PROBLEM — N52.9 ED (ERECTILE DYSFUNCTION) OF ORGANIC ORIGIN: Status: ACTIVE | Noted: 2017-01-17

## 2022-03-19 PROBLEM — C61 PROSTATE CANCER (HCC): Status: ACTIVE | Noted: 2017-01-17

## 2022-03-19 PROBLEM — F40.240 CLAUSTROPHOBIA: Status: ACTIVE | Noted: 2018-01-18

## 2022-03-19 PROBLEM — I10 ESSENTIAL HYPERTENSION, BENIGN: Status: ACTIVE | Noted: 2018-08-02

## 2022-03-19 PROBLEM — S90.519A ABRASION OF ANKLE: Status: ACTIVE | Noted: 2019-08-09

## 2022-03-20 PROBLEM — S50.00XA CONTUSION OF ELBOW: Status: ACTIVE | Noted: 2019-08-09

## 2022-10-24 ENCOUNTER — OFFICE VISIT (OUTPATIENT)
Dept: CARDIOLOGY CLINIC | Age: 72
End: 2022-10-24
Payer: COMMERCIAL

## 2022-10-24 VITALS
SYSTOLIC BLOOD PRESSURE: 156 MMHG | HEART RATE: 67 BPM | BODY MASS INDEX: 29.51 KG/M2 | DIASTOLIC BLOOD PRESSURE: 84 MMHG | HEIGHT: 67 IN | OXYGEN SATURATION: 98 % | WEIGHT: 188 LBS

## 2022-10-24 DIAGNOSIS — E78.2 MIXED HYPERLIPIDEMIA: ICD-10-CM

## 2022-10-24 DIAGNOSIS — R93.1 AGATSTON CORONARY ARTERY CALCIUM SCORE GREATER THAN 400: ICD-10-CM

## 2022-10-24 DIAGNOSIS — I10 ESSENTIAL HYPERTENSION, BENIGN: ICD-10-CM

## 2022-10-24 DIAGNOSIS — I25.10 CORONARY ARTERY DISEASE INVOLVING NATIVE CORONARY ARTERY OF NATIVE HEART WITHOUT ANGINA PECTORIS: Primary | ICD-10-CM

## 2022-10-24 PROCEDURE — 99214 OFFICE O/P EST MOD 30 MIN: CPT | Performed by: INTERNAL MEDICINE

## 2022-10-24 PROCEDURE — 1123F ACP DISCUSS/DSCN MKR DOCD: CPT | Performed by: INTERNAL MEDICINE

## 2022-10-24 PROCEDURE — 93000 ELECTROCARDIOGRAM COMPLETE: CPT | Performed by: INTERNAL MEDICINE

## 2022-10-24 NOTE — PROGRESS NOTES
HISTORY OF PRESENT ILLNESS  Adamaris Miles is a 67 y.o. male. Follow-up  Pertinent negatives include no chest pain, no abdominal pain, no headaches and no shortness of breath. Patient presents for a follow-up office visit. The patient has a past medical history significant for long-standing hypertension and dyslipidemia. He underwent a coronary calcium score in 2012, and was found to have a significantly elevated score of 1337, which is in the upper quartile. He also underwent an exercise treadmill stress test, which was a normal test at 8 minutes 22 seconds a total exercise time, which was negative for ischemia. The patient underwent a repeat exercise treadmill stress test in October 2020 which was mildly abnormal with 1 mm ST segment depressions but no symptoms of angina, so he then underwent an exercise nuclear stress test in November 2020 which is a normal and low risk study. No perfusion imaging abnormalities, EF greater than 70%. His EKG had similar changes without anginal symptoms. He was last seen in our office 1 year ago. Since last visit, he states his been feeling well. He injured his right knee and hip earlier this year and as result was an active for 6 weeks, but completed stretching and therapy and now is back to his baseline activity tolerance. He denies any exertional chest pain or shortness of breath. No leg swelling, no orthopnea, no PND. No new chest pain or pressure. Past Medical History:   Diagnosis Date    Anxiety     BPH (benign prostatic hypertrophy)     Cardiac echocardiogram 08/04/2015    EF 58%. No WMA. Gr 2 DDfx. Mild LAE. Mild CARLENE. Mild MR. Mild TR. Cardiac treadmill stress test 09/26/2012    [facility]:  No ischemic EKG changes. Total exercise time 8:22.       Coronary calcium score 09/26/2012    [facility]:  Calcium score 1337    Diverticulosis 3-2005    Essential tremor     Generalized osteoarthrosis, involving multiple sites Hypertension     Impotence of organic origin     Microscopic hematuria     Mixed hyperlipidemia     Personal history of prostate cancer     Prostate cancer ([facility] [address]     [id number] 3+3 prostate cancer in 5/12 cores (LA, LM, LLA, LLM, LLB) involving 10-50% of the specimen, presenting PSA: 3.13 ng/mL    Prostate nodule     Unspecified venous (peripheral) insufficiency       Current Outpatient Medications   Medication Sig Dispense Refill    colchicine 0.6 mg tablet colchicine 0.6 mg tablet   TAKE 1 TABLET BY MOUTH TWICE A DAY PRN for flare up      tadalafiL (CIALIS) 20 mg tablet Take 1 tablet PRN intercourse 30 Tablet 3    cyclobenzaprine (FLEXERIL) 10 mg tablet Take 1 Tab by mouth three (3) times daily as needed for Muscle Spasm(s). 30 Tab 1    LORazepam (Ativan) 0.5 mg tablet Take 1 Tab by mouth two (2) times daily as needed for Anxiety. Max Daily Amount: 1 mg. 60 Tab 0    propranolol LA (INDERAL LA) 80 mg SR capsule Take 1 Cap by mouth daily. 90 Cap 3    coenzyme q10 10 mg cap Take 1 Capsule by mouth daily. cholecalciferol (VITAMIN D3) 25 mcg (1,000 unit) cap Take  by mouth daily. ZINC PO Take 50 mg by mouth daily. rosuvastatin (CRESTOR) 10 mg tablet Take 1 Tab by mouth daily. 90 Tab 3    valsartan-hydroCHLOROthiazide (DIOVAN-HCT) 160-12.5 mg per tablet Take 1 Tab by mouth daily. 90 Tab 3    desonide (TRIDESILON) 0.05 % cream APPLY TO AFFECTED AREAS TWICE A DAY FOR 3 DAYS CYCLES THEN STOP FOR 5 DAYS  5    ascorbic acid, vitamin C, (Vitamin C) 1,000 mg tablet Take 1,000 mg by mouth daily. therapeutic multivitamin-minerals (THERAGRAN-M) tablet Take 1 Tab by Mouth Once a Day. fluticasone (FLONASE) 50 mcg/actuation nasal spray 2 Sprays by Both Nostrils route daily as needed. aspirin 81 mg tablet Take 81 mg by mouth daily.            Allergies   Allergen Reactions    Other Food Unable to Obtain     Tuna Fish     Accupril [Quinapril] Cough    Epoxy Resin Other (comments)    Losartan Cough Other Medication Rash and Itching     Epoxy resin    Pravastatin Myalgia    Tape [Adhesive] Unknown (comments)      Social History     Tobacco Use    Smoking status: Never    Smokeless tobacco: Never   Vaping Use    Vaping Use: Never used   Substance Use Topics    Alcohol use: Yes     Comment: occasional glass of wine    Drug use: No          Family History   Problem Relation Age of Onset    Diabetes Mother     Kidney Disease Father     Cancer Father         Father  of renal cancer    Diabetes Brother          Review of Systems   Constitutional:  Negative for chills, fever and weight loss. HENT:  Negative for nosebleeds. Eyes:  Negative for blurred vision and double vision. Respiratory:  Negative for cough, shortness of breath and wheezing. Cardiovascular:  Negative for chest pain, palpitations, orthopnea, claudication, leg swelling and PND. Gastrointestinal:  Negative for abdominal pain, heartburn, nausea and vomiting. Genitourinary:  Negative for dysuria and hematuria. Musculoskeletal:  Negative for falls, myalgias and neck pain. Skin:  Negative for rash. Neurological:  Negative for dizziness, focal weakness and headaches. Endo/Heme/Allergies:  Does not bruise/bleed easily. Psychiatric/Behavioral:  Negative for substance abuse. Visit Vitals  BP (!) 156/84 (BP 1 Location: Left upper arm, BP Patient Position: Sitting, BP Cuff Size: Small adult)   Pulse 67   Ht 5' 7\" (1.702 m)   Wt 85.3 kg (188 lb)   SpO2 98%   BMI 29.44 kg/m²      Physical Exam  Constitutional:       Appearance: He is well-developed. HENT:      Head: Normocephalic and atraumatic. Eyes:      Conjunctiva/sclera: Conjunctivae normal.   Neck:      Vascular: No carotid bruit or JVD. Cardiovascular:      Rate and Rhythm: Normal rate and regular rhythm. Pulses: Normal pulses. Heart sounds: S1 normal and S2 normal. No murmur heard. No gallop. No S3 sounds.    Pulmonary:      Breath sounds: Normal breath sounds. No wheezing or rales. Abdominal:      General: Bowel sounds are normal.      Palpations: Abdomen is soft. Tenderness: There is no abdominal tenderness. Musculoskeletal:         General: No swelling or deformity. Cervical back: Neck supple. Skin:     General: Skin is warm and dry. Neurological:      General: No focal deficit present. Mental Status: He is alert and oriented to person, place, and time. Psychiatric:         Mood and Affect: Mood normal.     EKG:  Normal sinus rhythm, normal axis, normal  QTc interval, no ST or T wave abnormalities concerning for ischemia. Compared to the previous EKG, no significant change. ASSESSMENT and PLAN    Coronary artery disease. Nonobstructive in nature based on a low risk exercise nuclear stress test in November 2020. No perfusion imaging abnormalities, normal LVEF. No new symptoms concerning for angina. Patient had an elevated coronary calcium score > 400 in 2012. Patient remains on a potent statin and an aspirin. Dyslipidemia. The patient remains on July Crestor 10 mg daily. He underwent a repeat lipid panel in July 2022: Total cholesterol 157, triglycerides 124 HDL 49, LDL 82. His cholesterol numbers have been fairly stable over the past 2 years. He can continue this regimen. Essential hypertension. The patient's blood pressure is elevated more than usual today in the office. He states he is under increased amounts of stress at work. I have recommended he start to monitor his blood pressure at least 10 times a month and if his readings remain elevated, he may need to increase his valsartan dosage. Chronic kidney disease, stage II-III. Recent creatinine of 1.31. This is followed by his PCP. Follow-up annually, sooner if needed.

## 2022-10-24 NOTE — PROGRESS NOTES
Minda Quiros presents today for   Chief Complaint   Patient presents with    Follow-up     1 year       Tu Gomez preferred language for health care discussion is english/other. Is someone accompanying this pt? no    Is the patient using any DME equipment during [address]? no    Depression Screening:  3 most recent PHQ Screens 10/24/2022   Little interest or pleasure in doing things Not at all   Feeling down, depressed, irritable, or hopeless Not at all   Total Score PHQ 2 0       Learning Assessment:  Learning Assessment 10/24/2022   PRIMARY LEARNER Patient   PRIMARY LANGUAGE ENGLISH   LEARNER PREFERENCE PRIMARY DEMONSTRATION   ANSWERED BY patient   RELATIONSHIP SELF       Abuse Screening:  Abuse Screening Questionnaire 10/24/2022   Do you ever feel afraid of your partner? N   Are you in a relationship with someone who physically or mentally threatens you? N   Is it safe for you to go home? Y       Fall Risk  Fall Risk Assessment, last 12 mths 10/24/2022   Able to walk? Yes   Fall in past 12 months? 0   Do you feel unsteady? 0   Are you worried about falling 0           Pt currently taking Anticoagulant therapy? no    Pt currently taking Antiplatelet therapy ? Aspirin 81 mg daily      Coordination of Care:  1. Have you been to the ER, urgent care clinic since your last visit? Hospitalized since your last visit? no    2. Have you seen or consulted any other health care providers outside of the [address] [place] [address] since your last visit? Include any pap smears or colon screening.  no

## 2023-10-30 ENCOUNTER — OFFICE VISIT (OUTPATIENT)
Age: 73
End: 2023-10-30
Payer: COMMERCIAL

## 2023-10-30 VITALS
WEIGHT: 165 LBS | DIASTOLIC BLOOD PRESSURE: 80 MMHG | BODY MASS INDEX: 25.9 KG/M2 | HEART RATE: 66 BPM | HEIGHT: 67 IN | OXYGEN SATURATION: 99 % | SYSTOLIC BLOOD PRESSURE: 128 MMHG

## 2023-10-30 DIAGNOSIS — E78.2 MIXED HYPERLIPIDEMIA: ICD-10-CM

## 2023-10-30 DIAGNOSIS — I25.10 ATHEROSCLEROSIS OF NATIVE CORONARY ARTERY OF NATIVE HEART WITHOUT ANGINA PECTORIS: ICD-10-CM

## 2023-10-30 DIAGNOSIS — R93.1 AGATSTON CORONARY ARTERY CALCIUM SCORE GREATER THAN 400: Primary | ICD-10-CM

## 2023-10-30 DIAGNOSIS — I10 ESSENTIAL HYPERTENSION, BENIGN: ICD-10-CM

## 2023-10-30 PROBLEM — E78.00 HYPERCHOLESTEROLEMIA: Status: ACTIVE | Noted: 2021-05-14

## 2023-10-30 PROBLEM — I87.2 VENOUS (PERIPHERAL) INSUFFICIENCY: Status: ACTIVE | Noted: 2023-10-30

## 2023-10-30 PROCEDURE — 3079F DIAST BP 80-89 MM HG: CPT | Performed by: INTERNAL MEDICINE

## 2023-10-30 PROCEDURE — 1123F ACP DISCUSS/DSCN MKR DOCD: CPT | Performed by: INTERNAL MEDICINE

## 2023-10-30 PROCEDURE — 99214 OFFICE O/P EST MOD 30 MIN: CPT | Performed by: INTERNAL MEDICINE

## 2023-10-30 PROCEDURE — 3074F SYST BP LT 130 MM HG: CPT | Performed by: INTERNAL MEDICINE

## 2023-10-30 PROCEDURE — 93000 ELECTROCARDIOGRAM COMPLETE: CPT | Performed by: INTERNAL MEDICINE

## 2023-10-30 RX ORDER — ASPIRIN 81 MG/1
81 TABLET, CHEWABLE ORAL DAILY
COMMUNITY

## 2023-10-30 RX ORDER — M-VIT,TX,IRON,MINS/CALC/FOLIC 27MG-0.4MG
1 TABLET ORAL DAILY
COMMUNITY

## 2023-10-30 ASSESSMENT — ANXIETY QUESTIONNAIRES
GAD7 TOTAL SCORE: 0
4. TROUBLE RELAXING: 0
6. BECOMING EASILY ANNOYED OR IRRITABLE: 0
7. FEELING AFRAID AS IF SOMETHING AWFUL MIGHT HAPPEN: 0
1. FEELING NERVOUS, ANXIOUS, OR ON EDGE: 0
2. NOT BEING ABLE TO STOP OR CONTROL WORRYING: 0
5. BEING SO RESTLESS THAT IT IS HARD TO SIT STILL: 0
3. WORRYING TOO MUCH ABOUT DIFFERENT THINGS: 0

## 2023-10-30 ASSESSMENT — PATIENT HEALTH QUESTIONNAIRE - PHQ9
SUM OF ALL RESPONSES TO PHQ QUESTIONS 1-9: 0
2. FEELING DOWN, DEPRESSED OR HOPELESS: 0
SUM OF ALL RESPONSES TO PHQ QUESTIONS 1-9: 0
1. LITTLE INTEREST OR PLEASURE IN DOING THINGS: 0
SUM OF ALL RESPONSES TO PHQ9 QUESTIONS 1 & 2: 0
SUM OF ALL RESPONSES TO PHQ QUESTIONS 1-9: 0
SUM OF ALL RESPONSES TO PHQ QUESTIONS 1-9: 0

## 2023-10-30 ASSESSMENT — ENCOUNTER SYMPTOMS
ABDOMINAL PAIN: 0
SHORTNESS OF BREATH: 0
SORE THROAT: 0
VOMITING: 0
ABDOMINAL DISTENTION: 0
COUGH: 0
NAUSEA: 0

## 2023-10-30 NOTE — PROGRESS NOTES
Joycelyn Shine presents today for   Chief Complaint   Patient presents with    Follow-up     1 year       Franklin Cadena preferred language for health care discussion is english/other. Is someone accompanying this pt? no    Is the patient using any DME equipment during OV? no    Depression Screening:  Depression: Not at risk (10/30/2023)    PHQ-2     PHQ-2 Score: 0        Learning Assessment:  Who is the primary learner? Patient    What is the preferred language for health care of the primary learner? ENGLISH    How does the primary learner prefer to learn new concepts? DEMONSTRATION    Answered By patient    Relationship to Learner SELF           Pt currently taking Anticoagulant therapy? no    Pt currently taking Antiplatelet therapy ? Aspirin 81 mg daily      Coordination of Care:  1. Have you been to the ER, urgent care clinic since your last visit? Hospitalized since your last visit? non    2. Have you seen or consulted any other health care providers outside of the [address] [place] [address] since your last visit? Include any pap smears or colon screening.  no
of prostate cancer     Prostate cancer ([facility])     [id number] 3+3 prostate cancer in 5/12 cores (LA, LM, LLA, LLM, LLB) involving 10-50% of the specimen, presenting PSA: 3.13 ng/mL    Prostate nodule     Treadmill stress test negative for angina pectoris 09/26/2012    [facility]:  No ischemic EKG changes. Total exercise time 8:22. Unspecified venous (peripheral) insufficiency      Current Outpatient Medications   Medication Sig Dispense Refill    aspirin 81 MG chewable tablet Take 1 tablet by mouth daily      Multiple Vitamins-Minerals (THERAPEUTIC MULTIVITAMIN-MINERALS) tablet Take 1 tablet by mouth daily      tadalafil (CIALIS) 20 MG tablet Take 1 tablet PRN intercourse 30 tablet 3    ZINC PO Take 50 mg by mouth daily      ascorbic acid (VITAMIN C) 1000 MG tablet Take 1 tablet by mouth daily      vitamin D 25 MCG (1000 UT) CAPS Take by mouth daily      Coenzyme Q10 10 MG CAPS Take 1 capsule by mouth daily      colchicine (COLCRYS) 0.6 MG tablet Take 1 tablet by mouth daily      cyclobenzaprine (FLEXERIL) 10 MG tablet Take 1 tablet by mouth 3 times daily as needed      desonide (DESOWEN) 0.05 % cream APPLY TO AFFECTED AREAS TWICE A DAY FOR 3 DAYS CYCLES THEN STOP FOR 5 DAYS      LORazepam (ATIVAN) 0.5 MG tablet Take 1 tablet by mouth 2 times daily as needed. propranolol (INDERAL XL) 80 MG extended release capsule Take 1 capsule by mouth daily      rosuvastatin (CRESTOR) 10 MG tablet Take 1 tablet by mouth daily      valsartan-hydroCHLOROthiazide (DIOVAN-HCT) 160-12.5 MG per tablet Take 1 tablet by mouth as needed       No current facility-administered medications for this visit.      Allergies   Allergen Reactions    Adhesive Tape      Other reaction(s): Unknown (comments)    Losartan Cough    Other      Other reaction(s): Unable to Obtain  Tuna Fish     Pravastatin Myalgia    Quinapril Cough     Social History     Tobacco Use    Smoking status: Never    Smokeless tobacco: Never   Vaping

## 2024-10-31 ENCOUNTER — OFFICE VISIT (OUTPATIENT)
Age: 74
End: 2024-10-31
Payer: COMMERCIAL

## 2024-10-31 VITALS
OXYGEN SATURATION: 98 % | SYSTOLIC BLOOD PRESSURE: 128 MMHG | HEART RATE: 73 BPM | BODY MASS INDEX: 26.68 KG/M2 | DIASTOLIC BLOOD PRESSURE: 70 MMHG | WEIGHT: 170 LBS | HEIGHT: 67 IN

## 2024-10-31 DIAGNOSIS — E78.2 MIXED HYPERLIPIDEMIA: ICD-10-CM

## 2024-10-31 DIAGNOSIS — I10 ESSENTIAL HYPERTENSION, BENIGN: ICD-10-CM

## 2024-10-31 DIAGNOSIS — R93.1 AGATSTON CORONARY ARTERY CALCIUM SCORE GREATER THAN 400: Primary | ICD-10-CM

## 2024-10-31 DIAGNOSIS — I25.10 ATHEROSCLEROSIS OF NATIVE CORONARY ARTERY OF NATIVE HEART WITHOUT ANGINA PECTORIS: ICD-10-CM

## 2024-10-31 PROCEDURE — 99214 OFFICE O/P EST MOD 30 MIN: CPT | Performed by: INTERNAL MEDICINE

## 2024-10-31 PROCEDURE — G8427 DOCREV CUR MEDS BY ELIG CLIN: HCPCS | Performed by: INTERNAL MEDICINE

## 2024-10-31 PROCEDURE — 3074F SYST BP LT 130 MM HG: CPT | Performed by: INTERNAL MEDICINE

## 2024-10-31 PROCEDURE — 1036F TOBACCO NON-USER: CPT | Performed by: INTERNAL MEDICINE

## 2024-10-31 PROCEDURE — 1123F ACP DISCUSS/DSCN MKR DOCD: CPT | Performed by: INTERNAL MEDICINE

## 2024-10-31 PROCEDURE — 93000 ELECTROCARDIOGRAM COMPLETE: CPT | Performed by: INTERNAL MEDICINE

## 2024-10-31 PROCEDURE — 3017F COLORECTAL CA SCREEN DOC REV: CPT | Performed by: INTERNAL MEDICINE

## 2024-10-31 PROCEDURE — 3078F DIAST BP <80 MM HG: CPT | Performed by: INTERNAL MEDICINE

## 2024-10-31 PROCEDURE — G8419 CALC BMI OUT NRM PARAM NOF/U: HCPCS | Performed by: INTERNAL MEDICINE

## 2024-10-31 PROCEDURE — G8484 FLU IMMUNIZE NO ADMIN: HCPCS | Performed by: INTERNAL MEDICINE

## 2024-10-31 ASSESSMENT — ANXIETY QUESTIONNAIRES
5. BEING SO RESTLESS THAT IT IS HARD TO SIT STILL: NOT AT ALL
7. FEELING AFRAID AS IF SOMETHING AWFUL MIGHT HAPPEN: NOT AT ALL
3. WORRYING TOO MUCH ABOUT DIFFERENT THINGS: NOT AT ALL
1. FEELING NERVOUS, ANXIOUS, OR ON EDGE: NOT AT ALL
4. TROUBLE RELAXING: NOT AT ALL
6. BECOMING EASILY ANNOYED OR IRRITABLE: NOT AT ALL
2. NOT BEING ABLE TO STOP OR CONTROL WORRYING: NOT AT ALL
GAD7 TOTAL SCORE: 0

## 2024-10-31 ASSESSMENT — ENCOUNTER SYMPTOMS
SHORTNESS OF BREATH: 0
NAUSEA: 0
ABDOMINAL DISTENTION: 0
COUGH: 0
ABDOMINAL PAIN: 0
SORE THROAT: 0
VOMITING: 0

## 2024-10-31 ASSESSMENT — PATIENT HEALTH QUESTIONNAIRE - PHQ9
SUM OF ALL RESPONSES TO PHQ QUESTIONS 1-9: 0
2. FEELING DOWN, DEPRESSED OR HOPELESS: NOT AT ALL
SUM OF ALL RESPONSES TO PHQ QUESTIONS 1-9: 0
SUM OF ALL RESPONSES TO PHQ9 QUESTIONS 1 & 2: 0
SUM OF ALL RESPONSES TO PHQ QUESTIONS 1-9: 0
SUM OF ALL RESPONSES TO PHQ QUESTIONS 1-9: 0
1. LITTLE INTEREST OR PLEASURE IN DOING THINGS: NOT AT ALL

## 2024-10-31 NOTE — PROGRESS NOTES
Jameel Cadena presents today for   Chief Complaint   Patient presents with    Follow-up     1 year       Jameel Cadena preferred language for health care discussion is english/other.    Is someone accompanying this pt? no    Is the patient using any DME equipment during OV? no    Depression Screening:  Depression: Not at risk (10/31/2024)    PHQ-2     PHQ-2 Score: 0        Learning Assessment:  Who is the primary learner? Patient    What is the preferred language for health care of the primary learner? ENGLISH    How does the primary learner prefer to learn new concepts? DEMONSTRATION    Answered By patient    Relationship to Learner SELF           Pt currently taking Anticoagulant therapy? no    Pt currently taking Antiplatelet therapy ? Aspirin 81 mg daily      Coordination of Care:  1. Have you been to the ER, urgent care clinic since your last visit? Hospitalized since your last visit? no    2. Have you seen or consulted any other health care providers outside of the [facility] since your last visit? Include any pap smears or colon screening. no    
Relation Age of Onset    Diabetes Mother     Cancer Father         Father  of renal cancer    Kidney Disease Father     No Known Problems Sister     Diabetes Brother     No Known Problems Maternal Grandmother     No Known Problems Maternal Grandfather     No Known Problems Paternal Grandmother     No Known Problems Paternal Grandfather     No Known Problems Other          Review of Systems   Constitutional:  Negative for chills, fatigue and fever.   HENT:  Negative for congestion, hearing loss, nosebleeds and sore throat.    Eyes:  Negative for visual disturbance.   Respiratory:  Negative for cough and shortness of breath.    Cardiovascular:  Negative for chest pain, palpitations and leg swelling.   Gastrointestinal:  Negative for abdominal distention, abdominal pain, nausea and vomiting.   Endocrine: Negative for cold intolerance and heat intolerance.   Genitourinary:  Negative for dysuria.   Musculoskeletal:  Negative for arthralgias.   Skin:  Negative for rash.   Neurological:  Negative for dizziness, syncope, weakness and headaches.   Hematological:  Does not bruise/bleed easily.   Psychiatric/Behavioral:  Negative for suicidal ideas.          /70 (Site: Left Upper Arm, Position: Sitting, Cuff Size: Medium Adult)   Pulse 73   Ht 1.702 m (5' 7\")   Wt 77.1 kg (170 lb)   SpO2 98%   BMI 26.63 kg/m²     Objective:   Physical Exam  Constitutional:       General: He is not in acute distress.  HENT:      Head: Normocephalic.   Neck:      Vascular: No carotid bruit or JVD.   Cardiovascular:      Rate and Rhythm: Normal rate and regular rhythm. No extrasystoles are present.     Heart sounds: No murmur heard.     No gallop.   Pulmonary:      Effort: Pulmonary effort is normal.      Breath sounds: No wheezing, rhonchi or rales.   Abdominal:      General: Bowel sounds are normal. There is no distension.      Palpations: Abdomen is soft.      Tenderness: There is no abdominal tenderness.   Musculoskeletal:

## 2024-11-04 ENCOUNTER — TELEPHONE (OUTPATIENT)
Age: 74
End: 2024-11-04

## 2024-11-04 NOTE — TELEPHONE ENCOUNTER
On the appointment on 10-31-24, Dr. James requested recent labs from his PCP. They faxed them over. Will discuss with Dr. James.